# Patient Record
Sex: MALE | Race: BLACK OR AFRICAN AMERICAN
[De-identification: names, ages, dates, MRNs, and addresses within clinical notes are randomized per-mention and may not be internally consistent; named-entity substitution may affect disease eponyms.]

---

## 2017-01-21 ENCOUNTER — HOSPITAL ENCOUNTER (EMERGENCY)
Dept: HOSPITAL 62 - ER | Age: 67
Discharge: HOME | End: 2017-01-21
Payer: MEDICARE

## 2017-01-21 VITALS — DIASTOLIC BLOOD PRESSURE: 88 MMHG | SYSTOLIC BLOOD PRESSURE: 142 MMHG

## 2017-01-21 DIAGNOSIS — I50.9: ICD-10-CM

## 2017-01-21 DIAGNOSIS — R06.02: ICD-10-CM

## 2017-01-21 DIAGNOSIS — I25.2: ICD-10-CM

## 2017-01-21 DIAGNOSIS — E11.9: ICD-10-CM

## 2017-01-21 DIAGNOSIS — K21.9: ICD-10-CM

## 2017-01-21 DIAGNOSIS — E78.00: ICD-10-CM

## 2017-01-21 DIAGNOSIS — J44.9: ICD-10-CM

## 2017-01-21 DIAGNOSIS — Z87.442: ICD-10-CM

## 2017-01-21 DIAGNOSIS — R07.9: Primary | ICD-10-CM

## 2017-01-21 DIAGNOSIS — R42: ICD-10-CM

## 2017-01-21 DIAGNOSIS — I10: ICD-10-CM

## 2017-01-21 DIAGNOSIS — Z90.49: ICD-10-CM

## 2017-01-21 LAB
ALBUMIN SERPL-MCNC: 4.2 G/DL (ref 3.5–5)
ALP SERPL-CCNC: 76 U/L (ref 38–126)
ALT SERPL-CCNC: 40 U/L (ref 21–72)
ANION GAP SERPL CALC-SCNC: 12 MMOL/L (ref 5–19)
AST SERPL-CCNC: 38 U/L (ref 17–59)
BASOPHILS # BLD AUTO: 0 10^3/UL (ref 0–0.2)
BASOPHILS NFR BLD AUTO: 0.4 % (ref 0–2)
BILIRUB DIRECT SERPL-MCNC: 0 MG/DL (ref 0–0.3)
BILIRUB SERPL-MCNC: 0.4 MG/DL (ref 0.2–1.3)
BUN SERPL-MCNC: 21 MG/DL (ref 7–20)
CALCIUM: 9.4 MG/DL (ref 8.4–10.2)
CHLORIDE SERPL-SCNC: 105 MMOL/L (ref 98–107)
CK MB SERPL-MCNC: 1.48 NG/ML (ref ?–4.55)
CK SERPL-CCNC: 151 U/L (ref 55–170)
CO2 SERPL-SCNC: 28 MMOL/L (ref 22–30)
CREAT SERPL-MCNC: 1.81 MG/DL (ref 0.52–1.25)
EOSINOPHIL # BLD AUTO: 0.2 10^3/UL (ref 0–0.6)
EOSINOPHIL NFR BLD AUTO: 4.2 % (ref 0–6)
ERYTHROCYTE [DISTWIDTH] IN BLOOD BY AUTOMATED COUNT: 14.5 % (ref 11.5–14)
GLUCOSE SERPL-MCNC: 125 MG/DL (ref 75–110)
HCT VFR BLD CALC: 45.4 % (ref 37.9–51)
HGB BLD-MCNC: 14.3 G/DL (ref 13.5–17)
HGB HCT DIFFERENCE: -2.5
LYMPHOCYTES # BLD AUTO: 1.6 10^3/UL (ref 0.5–4.7)
LYMPHOCYTES NFR BLD AUTO: 31.6 % (ref 13–45)
MCH RBC QN AUTO: 30.6 PG (ref 27–33.4)
MCHC RBC AUTO-ENTMCNC: 31.6 G/DL (ref 32–36)
MCV RBC AUTO: 97 FL (ref 80–97)
MONOCYTES # BLD AUTO: 0.3 10^3/UL (ref 0.1–1.4)
MONOCYTES NFR BLD AUTO: 5.3 % (ref 3–13)
NEUTROPHILS # BLD AUTO: 3 10^3/UL (ref 1.7–8.2)
NEUTS SEG NFR BLD AUTO: 58.5 % (ref 42–78)
POTASSIUM SERPL-SCNC: 4.4 MMOL/L (ref 3.6–5)
PROT SERPL-MCNC: 6.9 G/DL (ref 6.3–8.2)
RBC # BLD AUTO: 4.67 10^6/UL (ref 4.35–5.55)
SODIUM SERPL-SCNC: 145.2 MMOL/L (ref 137–145)
TROPONIN I SERPL-MCNC: < 0.012 NG/ML
WBC # BLD AUTO: 5.1 10^3/UL (ref 4–10.5)

## 2017-01-21 PROCEDURE — 93010 ELECTROCARDIOGRAM REPORT: CPT

## 2017-01-21 PROCEDURE — 80053 COMPREHEN METABOLIC PANEL: CPT

## 2017-01-21 PROCEDURE — 82553 CREATINE MB FRACTION: CPT

## 2017-01-21 PROCEDURE — 85025 COMPLETE CBC W/AUTO DIFF WBC: CPT

## 2017-01-21 PROCEDURE — 84484 ASSAY OF TROPONIN QUANT: CPT

## 2017-01-21 PROCEDURE — 82550 ASSAY OF CK (CPK): CPT

## 2017-01-21 PROCEDURE — 83880 ASSAY OF NATRIURETIC PEPTIDE: CPT

## 2017-01-21 PROCEDURE — 99285 EMERGENCY DEPT VISIT HI MDM: CPT

## 2017-01-21 PROCEDURE — 36415 COLL VENOUS BLD VENIPUNCTURE: CPT

## 2017-01-21 PROCEDURE — 93005 ELECTROCARDIOGRAM TRACING: CPT

## 2017-01-21 PROCEDURE — 71010: CPT

## 2017-01-21 NOTE — ER DOCUMENT REPORT
ED General





- General


Chief Complaint: Chest Pain


Stated Complaint: DIFFICULTY BREATHING


Time seen by provider: 14:15


Mode of Arrival: Wheelchair


Information source: Patient, [facility] Records


Notes: 


This 66-year-old male patient comes over from complaining of onset 10 AM this 

morning while just sitting around getting ready to eat when he had chest 

pressure, shortness of breath, and felt dizzy.  He states he checked his blood 

pressure and it was 100 systolic.  At this time while I speak with him, his 

pulse is 78, his room air pulse ox is 95%, his blood pressure is 142/85.  He is 

not tachypneic.





He does have a history congestive heart failure with an EF of 20%, prior MI, 

diabetes, hypertension, chronic renal insufficiency and COPD.  This is 

complicated by depression and morbid obesity.


TRAVEL OUTSIDE OF THE [place] IN LAST 30 DAYS: No





- Related Data


Allergies/Adverse Reactions: 


 





No Known Allergies Allergy (Verified 01/21/17 13:53)


 











Past Medical History





- General


Information source: Patient, [facility] Records





- Social History


Smoking Status: Never Smoker


Cigarette use (# per day): No


Chew tobacco use (# tins/day): No


Smoking Education Provided: No


Frequency of alcohol use: None


Drug Abuse: None


Occupation: retired


Lives with: Alone


Family History: Reviewed & Not Pertinent


Patient has suicidal ideation: No


Patient has homicidal ideation: No





- Past Medical History


Cardiac Medical History: Reports: Hx Congestive Heart Failure, Hx Heart Attack, 

Hx Hypercholesterolemia, Hx Hypertension


Pulmonary Medical History: Reports: Hx COPD


EENT Medical History: Reports: None


Neurological Medical History: Reports: None


Endocrine Medical History: Reports: Hx Diabetes Mellitus Type 2


Renal/ Medical History: Reports: Hx Kidney Stones, Hx Renal Insufficiency


GI Medical History: Reports: Hx Gastroesophageal Reflux Disease


Musculoskeltal Medical History: Reports None


Skin Medical History: Reports None


Psychiatric Medical History: Reports: Hx Depression


Past Surgical History: Reports: Hx Abdominal Surgery - Nissen fundoplication, 

Hx Cholecystectomy, Hx Tonsillectomy





- Immunizations


Hx Diphtheria, Pertussis, Tetanus Vaccination: Yes


Hx Pneumococcal Vaccination: 01/01/13





Review of Systems





- Review of Systems


Constitutional: No symptoms reported


EENT: No symptoms reported


Cardiovascular: See HPI


Respiratory: See HPI


Gastrointestinal: No symptoms reported


Genitourinary: No symptoms reported


Musculoskeletal: No symptoms reported


Skin: No symptoms reported


Hematologic/Lymphatic: No symptoms reported


Neurological/Psychological: No symptoms reported





Physical Exam





- Vital signs


Vitals: 


 











Temp Pulse Resp BP Pulse Ox


 


 98.1 F   82   16   142/85 H  94 


 


 01/21/17 13:43  01/21/17 13:43  01/21/17 13:43  01/21/17 13:43  01/21/17 13:43











Interpretation: Normal





- General


General appearance: Appears well, Alert


In distress: None





- HEENT


Head: Normocephalic, Atraumatic


Eyes: Normal


Pupils: PERRL


Pharynx: Normal


Neck: Normal





- Respiratory


Respiratory status: No respiratory distress


Breath sounds: Normal





- Cardiovascular


Rhythm: Regular


Heart sounds: Normal auscultation


Murmur: No





- Abdominal


Inspection: Morbidly Obese


Bowel sounds: Normal


Tenderness: Nontender





- Back


Back: Normal





- Extremities


General upper extremity: Normal inspection


General lower extremity: Normal inspection.  No: Edema





- Neurological


Neuro grossly intact: Yes





- Psychological


Associated symptoms: Normal affect, Normal mood





- Skin


Skin Temperature: Warm


Skin Moisture: Dry


Skin Color: Normal





Course





- Re-evaluation


Re-evalutation: 


01/21/17 16:06


The patient continues to feel well and is in no distress.  His pulse ox remains 

in the 95-96% range on room air and he is not tachypneic.  His pulse remains in 

the mid 70s.  His blood pressure running 145/84.


His BNP is only 98, his chest x-ray shows no heart failure or infiltrates.  His 

EKG shows no acute changes.  His troponin is undetectable.  His chemistries and 

renal function are at his baseline, his CBC does not show suggestion of 

infection or anemia.





I spoke with the patient at this point have no explanation for the symptoms and 

blood pressure findings he reported at home.  He is cautioned to just take it 

easy and see how things go.  He is to return immediately if his symptoms return.











- Vital Signs


Vital signs: 


 











Temp Pulse Resp BP Pulse Ox


 


 98.1 F   82   22 H  136/79 H  94 


 


 01/21/17 13:43  01/21/17 13:43  01/21/17 15:31  01/21/17 15:31  01/21/17 15:31














- Laboratory


Result Diagrams: 


 01/21/17 14:16





 01/21/17 14:16


Laboratory results interpreted by me: 


 











  01/21/17 01/21/17





  14:16 14:16


 


MCHC  31.6 L 


 


RDW  14.5 H 


 


Sodium   145.2 H


 


BUN   21 H


 


Creatinine   1.81 H


 


Est GFR ( Amer)   45 L


 


Est GFR (Non-Af Amer)   38 L


 


Glucose   125 H














- Diagnostic Test


Radiology reviewed: Image reviewed, Reports reviewed - CXR--NAD





- EKG Interpretation by Me


EKG shows normal: Sinus rhythm, Axis, Intervals, ST-T Waves.  abnormal: QRS 

Complexes - Old inferior wall MI


Rate: Normal - 82


Rhythm: PVC's


Axis/QRS: IVCD


Heart block present: 1st Degree


When compared to previous EKG there are: No significant change





Discharge





- Discharge


Clinical Impression: 


 Chest tightness or pressure, Shortness of breath, Dizziness





Condition: Stable


Disposition: HOME, SELF-CARE


Additional Instructions: 


Your vital signs, chest x-ray, EKG, and a blood work did not show any 

suggestion of infection, heart attack, or heart failure.


There is no explanation found for the symptoms she experienced earlier this 

morning.


You should continue to rest over the next few days.  Continue her regular 

medications.


Follow-up with your doctor this week for recheck.





RETURN TO THE EMERGENCY ROOM IF ANY NEW OR WORSENING SYMPTOMS.








Referrals: 


NIKI GRANT MD [Primary Care Provider] - Follow up as needed

## 2017-01-21 NOTE — ER DOCUMENT REPORT
ED Medical Screen (RME)





- General


Stated Complaint: DIFFICULTY BREATHING


Time seen by provider: 13:52


Mode of Arrival: Wheelchair


Information source: Patient


Notes: 


66-year-old male with a history of MI, hypertension, diabetes is complaining of 

dizziness, low blood pressure that he took at home, and chest pressure that 

started at 10 AM this morning.


TRAVEL OUTSIDE OF THE [place] IN LAST 30 DAYS: No





- Related Data


Allergies/Adverse Reactions: 


 





No Known Allergies Allergy (Verified 01/21/17 13:53)


 











Past Medical History





- Past Medical History


Cardiac Medical History: Reports: Hx Congestive Heart Failure, Hx Heart Attack, 

Hx Hypercholesterolemia, Hx Hypertension


Pulmonary Medical History: Reports: Hx COPD


Endocrine Medical History: Reports: Hx Diabetes Mellitus Type 2


Renal/ Medical History: Reports: Hx Kidney Stones, Hx Renal Insufficiency


Psychiatric Medical History: Reports: Hx Depression


Past Surgical History: Reports: Hx Cholecystectomy, Hx Tonsillectomy





- Immunizations


Hx Diphtheria, Pertussis, Tetanus Vaccination: Yes





Physical Exam





- Vital signs


Vitals: 


 











Temp Pulse Resp BP Pulse Ox


 


 98.1 F   82   16   142/85 H  94 


 


 01/21/17 13:43  01/21/17 13:43  01/21/17 13:43  01/21/17 13:43  01/21/17 13:43














Course





- Vital Signs


Vital signs: 


 











Temp Pulse Resp BP Pulse Ox


 


 98.1 F   82   16   142/85 H  94 


 


 01/21/17 13:43  01/21/17 13:43  01/21/17 13:43  01/21/17 13:43  01/21/17 13:43

## 2017-01-21 NOTE — EKG REPORT
SEVERITY:- ABNORMAL ECG -

SINUS RHYTHM

MULTIPLE VENTRICULAR PREMATURE COMPLEXES

FIRST DEGREE AV BLOCK

NONSPECIFIC IVCD WITH LAD

PROBABLE INFERIOR INFARCT, OLD

CONSIDER ANTEROSEPTAL INFARCT

:

Confirmed by: Melquiades Baird MD 21-Jan-2017 16:37:41

## 2017-03-07 ENCOUNTER — HOSPITAL ENCOUNTER (EMERGENCY)
Dept: HOSPITAL 62 - ER | Age: 67
Discharge: HOME | End: 2017-03-07
Payer: MEDICARE

## 2017-03-07 VITALS — SYSTOLIC BLOOD PRESSURE: 141 MMHG | DIASTOLIC BLOOD PRESSURE: 89 MMHG

## 2017-03-07 DIAGNOSIS — R60.0: ICD-10-CM

## 2017-03-07 DIAGNOSIS — J44.9: Primary | ICD-10-CM

## 2017-03-07 DIAGNOSIS — Z87.891: ICD-10-CM

## 2017-03-07 DIAGNOSIS — R55: ICD-10-CM

## 2017-03-07 DIAGNOSIS — I25.2: ICD-10-CM

## 2017-03-07 DIAGNOSIS — I10: ICD-10-CM

## 2017-03-07 DIAGNOSIS — R53.1: ICD-10-CM

## 2017-03-07 DIAGNOSIS — E11.9: ICD-10-CM

## 2017-03-07 DIAGNOSIS — I49.3: ICD-10-CM

## 2017-03-07 DIAGNOSIS — I44.0: ICD-10-CM

## 2017-03-07 DIAGNOSIS — R06.02: ICD-10-CM

## 2017-03-07 LAB
ALBUMIN SERPL-MCNC: 4.1 G/DL (ref 3.5–5)
ALP SERPL-CCNC: 74 U/L (ref 38–126)
ALT SERPL-CCNC: 36 U/L (ref 21–72)
ANION GAP SERPL CALC-SCNC: 14 MMOL/L (ref 5–19)
AST SERPL-CCNC: 37 U/L (ref 17–59)
BASOPHILS # BLD AUTO: 0 10^3/UL (ref 0–0.2)
BASOPHILS NFR BLD AUTO: 0.1 % (ref 0–2)
BILIRUB DIRECT SERPL-MCNC: 0 MG/DL (ref 0–0.3)
BILIRUB SERPL-MCNC: 0.3 MG/DL (ref 0.2–1.3)
BUN SERPL-MCNC: 20 MG/DL (ref 7–20)
CALCIUM: 9.5 MG/DL (ref 8.4–10.2)
CHLORIDE SERPL-SCNC: 99 MMOL/L (ref 98–107)
CK MB SERPL-MCNC: 1.71 NG/ML (ref ?–4.55)
CK SERPL-CCNC: 168 U/L (ref 55–170)
CO2 SERPL-SCNC: 29 MMOL/L (ref 22–30)
CREAT SERPL-MCNC: 1.95 MG/DL (ref 0.52–1.25)
EOSINOPHIL # BLD AUTO: 0.1 10^3/UL (ref 0–0.6)
EOSINOPHIL NFR BLD AUTO: 2.1 % (ref 0–6)
ERYTHROCYTE [DISTWIDTH] IN BLOOD BY AUTOMATED COUNT: 14.3 % (ref 11.5–14)
GLUCOSE SERPL-MCNC: 104 MG/DL (ref 75–110)
HCT VFR BLD CALC: 44.4 % (ref 37.9–51)
HGB BLD-MCNC: 14.7 G/DL (ref 13.5–17)
HGB HCT DIFFERENCE: -0.3
LYMPHOCYTES # BLD AUTO: 1.1 10^3/UL (ref 0.5–4.7)
LYMPHOCYTES NFR BLD AUTO: 15.6 % (ref 13–45)
MCH RBC QN AUTO: 31.8 PG (ref 27–33.4)
MCHC RBC AUTO-ENTMCNC: 33.2 G/DL (ref 32–36)
MCV RBC AUTO: 96 FL (ref 80–97)
MONOCYTES # BLD AUTO: 0.4 10^3/UL (ref 0.1–1.4)
MONOCYTES NFR BLD AUTO: 6.1 % (ref 3–13)
NEUTROPHILS # BLD AUTO: 5.3 10^3/UL (ref 1.7–8.2)
NEUTS SEG NFR BLD AUTO: 76.1 % (ref 42–78)
POTASSIUM SERPL-SCNC: 4.3 MMOL/L (ref 3.6–5)
PROT SERPL-MCNC: 7.1 G/DL (ref 6.3–8.2)
RBC # BLD AUTO: 4.64 10^6/UL (ref 4.35–5.55)
SODIUM SERPL-SCNC: 142 MMOL/L (ref 137–145)
TROPONIN I SERPL-MCNC: < 0.012 NG/ML
WBC # BLD AUTO: 6.9 10^3/UL (ref 4–10.5)

## 2017-03-07 PROCEDURE — 93010 ELECTROCARDIOGRAM REPORT: CPT

## 2017-03-07 PROCEDURE — 93005 ELECTROCARDIOGRAM TRACING: CPT

## 2017-03-07 PROCEDURE — 80053 COMPREHEN METABOLIC PANEL: CPT

## 2017-03-07 PROCEDURE — 71010: CPT

## 2017-03-07 PROCEDURE — 85025 COMPLETE CBC W/AUTO DIFF WBC: CPT

## 2017-03-07 PROCEDURE — 36415 COLL VENOUS BLD VENIPUNCTURE: CPT

## 2017-03-07 PROCEDURE — 99285 EMERGENCY DEPT VISIT HI MDM: CPT

## 2017-03-07 PROCEDURE — 82962 GLUCOSE BLOOD TEST: CPT

## 2017-03-07 PROCEDURE — 82553 CREATINE MB FRACTION: CPT

## 2017-03-07 PROCEDURE — 82550 ASSAY OF CK (CPK): CPT

## 2017-03-07 PROCEDURE — 84484 ASSAY OF TROPONIN QUANT: CPT

## 2017-03-07 NOTE — ER DOCUMENT REPORT
ED General





- General


Mode of Arrival: Medic


Information source: Patient


TRAVEL OUTSIDE OF THE [place] IN LAST 30 DAYS: No





- HPI


Onset: This morning


Onset/Duration: Sudden


Quality of pain: No pain


Associated symptoms: Other - see narrative





<WILIAN ESCALANTE - Last Filed: 03/07/17 07:11>





<SILVIO CHAVIRA - Last Filed: 03/07/17 11:34>





- General


Stated Complaint: DIFFICULTY BREATHING


Notes: 


Patient is a 66 year old male that presents to the emergency department today 

with complaints of generalized weakness, near-syncope, dizziness, and shortness 

of breath which began this morning upon wakening. Patient states when he went 

to bed last night he felt normal and his symptoms began this morning. Patient 

denies any chest pain. (WILIAN ESCALANTE)





- Related Data


Allergies/Adverse Reactions: 


 





No Known Allergies Allergy (Verified 01/21/17 13:53)


 











Past Medical History





- General


Information source: Patient, [facility] Records





- Social History


Smoking Status: Former Smoker


Cigarette use (# per day): No


Frequency of alcohol use: None


Drug Abuse: None


Lives with: Family


Family History: Reviewed & Not Pertinent





- Past Medical History


Cardiac Medical History: Reports: Hx Congestive Heart Failure, Hx Heart Attack, 

Hx Hypercholesterolemia, Hx Hypertension


Pulmonary Medical History: Reports: Hx COPD


Endocrine Medical History: Reports: Hx Diabetes Mellitus Type 2 - IDDM


Renal/ Medical History: Reports: Hx Kidney Stones, Hx Renal Insufficiency


GI Medical History: Reports: Hx Gastroesophageal Reflux Disease


Psychiatric Medical History: Reports: Hx Depression


Past Surgical History: Reports: Hx Abdominal Surgery - Nissen fundoplication, 

Hx Cholecystectomy, Hx Tonsillectomy





- Immunizations


Hx Diphtheria, Pertussis, Tetanus Vaccination: Yes


Hx Pneumococcal Vaccination: 01/01/13





<WILIAN ESCALANTE - Last Filed: 03/07/17 07:11>





Review of Systems





- Review of Systems


Constitutional: See HPI, Weakness


EENT: No symptoms reported


Cardiovascular: See HPI, Syncope - near syncope, Dizziness


Respiratory: See HPI, Short of breath


Gastrointestinal: No symptoms reported


Genitourinary: No symptoms reported


Male Genitourinary: No symptoms reported


Musculoskeletal: No symptoms reported


Skin: No symptoms reported


Hematologic/Lymphatic: No symptoms reported


Neurological/Psychological: No symptoms reported


-: Yes All other systems reviewed and negative





<WILIAN ESCALANTE - Last Filed: 03/07/17 07:11>





Physical Exam





- Vital signs


Interpretation: Normal, Other - 98% on room air





- General


General appearance: Appears well, Alert


In distress: None





- HEENT


Head: Normocephalic, Atraumatic


Eyes: Normal


Conjunctiva: Normal


Extraocular movements intact: Yes





- Respiratory


Respiratory status: No respiratory distress


Chest status: Nontender


Breath sounds: Normal





- Cardiovascular


Rhythm: Regular


Heart sounds: Normal auscultation


Murmur: No





- Abdominal


Inspection: Normal


Distension: No distension


Bowel sounds: Normal





- Extremities


General upper extremity: Normal inspection, Normal ROM.  No: Edema


General lower extremity: Edema - trace edema bilaterally





- Neurological


Neuro grossly intact: Yes


Cognition: Normal


Speech: Normal





- Psychological


Associated symptoms: Normal affect, Normal mood





- Skin


Skin Temperature: Warm


Skin Moisture: Dry


Skin Color: Normal





<WILIAN ESCALANTE - Last Filed: 03/07/17 07:11>





Course





<WILIAN ESCALANTE - Last Filed: 03/07/17 07:11>





- Laboratory


Result Diagrams: 


 03/07/17 07:25





 03/07/17 07:25





- EKG Interpretation by Me


EKG shows normal: Sinus rhythm, Axis, Intervals, QRS Complexes, ST-T Waves


Rate: Normal - 65


Rhythm: NSR, PVC's


Axis/QRS: IVCD


Heart block present: 1st Degree





<SILVIO CHAVIRA - Last Filed: 03/07/17 11:34>





- Re-evaluation


Re-evalutation: 


03/07/17 11:27


The patient was seen here on 01/21/2017 complaining of shortness of breath, 

chest tightness or pressure, and dizziness.  Workup at that time was 

essentially negative and he was discharged home to follow-up with his primary 

care provider.  He states that no significant event occurred in the interim 

between that visit and today.


Today he complains of weakness, dizziness and shortness of breath and again has 

a negative workup.








 (SILVIO CHAVIRA)





- Vital Signs


Vital signs: 


 











Temp Pulse Resp BP Pulse Ox


 


       14   125/87 H  97 


 


       03/07/17 08:01  03/07/17 08:01  03/07/17 08:01














- Laboratory


Laboratory results interpreted by me: 


 











  03/07/17 03/07/17





  07:25 07:25


 


RDW  14.3 H 


 


Creatinine   1.95 H


 


Est GFR ( Amer)   42 L


 


Est GFR (Non-Af Amer)   34 L














Discharge





<WILIAN ESCALANTE - Last Filed: 03/07/17 07:11>





<SILVIO CHAVIRA - Last Filed: 03/07/17 11:34>





- Discharge


Clinical Impression: 


 Weakness, Dizziness





Dyspnea


Qualifiers:


 Dyspnea type: unspecified Qualified Code(s): R06.00 - Dyspnea, unspecified





Condition: Stable


Disposition: HOME, SELF-CARE


Additional Instructions: 


There were no new or worrisome findings discovered on your lab work, physical 

examination, chest x-ray, and EKG today.


There is no clear explanation for your symptoms.


You should continue your regular medications.


Follow-up with Dr. Arevalo.





RETURN TO THE EMERGENCY ROOM IF ANY NEW OR WORSENING SYMPTOMS.








Referrals: 


NIKI AREVALO MD [Primary Care Provider] - Follow up in 3-5 days


Scribe Attestation: 





03/07/17 11:33


I personally performed the services described in the documentation, reviewed 

and edited the documentation which was dictated to the scribe in my presence, 

and it accurately records my words and actions. (SILVIO CHAVIRA)





Scribe Documentation





- Scribe


Written by Jerry:: Jerry Suh, 3/7/2017 0711


acting as scribe for :: No





<WILIAN ESCALANTE - Last Filed: 03/07/17 07:11>

## 2017-03-08 NOTE — EKG REPORT
SEVERITY:- ABNORMAL ECG -

SINUS RHYTHM

VENTRICULAR TRIGEMINY

FIRST DEGREE AV BLOCK

NONSPECIFIC INTRAVENTRICULAR CONDUCTION DELAY

:

Confirmed by: Jerardo Ulloa 08-Mar-2017 09:34:06

## 2017-06-09 ENCOUNTER — HOSPITAL ENCOUNTER (OUTPATIENT)
Dept: HOSPITAL 62 - OD | Age: 67
End: 2017-06-09
Attending: INTERNAL MEDICINE
Payer: MEDICARE

## 2017-06-09 DIAGNOSIS — E11.22: Primary | ICD-10-CM

## 2017-06-09 DIAGNOSIS — I12.9: ICD-10-CM

## 2017-06-09 DIAGNOSIS — N18.3: ICD-10-CM

## 2017-06-09 DIAGNOSIS — E87.5: ICD-10-CM

## 2017-06-09 LAB
ANION GAP SERPL CALC-SCNC: 13 MMOL/L (ref 5–19)
BUN SERPL-MCNC: 20 MG/DL (ref 7–20)
CALCIUM: 9.1 MG/DL (ref 8.4–10.2)
CHLORIDE SERPL-SCNC: 104 MMOL/L (ref 98–107)
CO2 SERPL-SCNC: 27 MMOL/L (ref 22–30)
CREAT SERPL-MCNC: 1.82 MG/DL (ref 0.52–1.25)
ERYTHROCYTE [DISTWIDTH] IN BLOOD BY AUTOMATED COUNT: 14.8 % (ref 11.5–14)
GLUCOSE SERPL-MCNC: 97 MG/DL (ref 75–110)
HCT VFR BLD CALC: 44.5 % (ref 37.9–51)
HGB BLD-MCNC: 14.5 G/DL (ref 13.5–17)
HGB HCT DIFFERENCE: -1
MCH RBC QN AUTO: 31.7 PG (ref 27–33.4)
MCHC RBC AUTO-ENTMCNC: 32.6 G/DL (ref 32–36)
MCV RBC AUTO: 97 FL (ref 80–97)
POTASSIUM SERPL-SCNC: 4.3 MMOL/L (ref 3.6–5)
RBC # BLD AUTO: 4.59 10^6/UL (ref 4.35–5.55)
SODIUM SERPL-SCNC: 144.2 MMOL/L (ref 137–145)
WBC # BLD AUTO: 6 10^3/UL (ref 4–10.5)

## 2017-06-09 PROCEDURE — 85027 COMPLETE CBC AUTOMATED: CPT

## 2017-06-09 PROCEDURE — 36415 COLL VENOUS BLD VENIPUNCTURE: CPT

## 2017-06-09 PROCEDURE — 80048 BASIC METABOLIC PNL TOTAL CA: CPT

## 2017-07-06 ENCOUNTER — HOSPITAL ENCOUNTER (EMERGENCY)
Dept: HOSPITAL 62 - ER | Age: 67
LOS: 1 days | Discharge: HOME | End: 2017-07-07
Payer: MEDICARE

## 2017-07-06 DIAGNOSIS — R07.9: Primary | ICD-10-CM

## 2017-07-06 DIAGNOSIS — R05: ICD-10-CM

## 2017-07-06 LAB
ALBUMIN SERPL-MCNC: 3.9 G/DL (ref 3.5–5)
ALP SERPL-CCNC: 81 U/L (ref 38–126)
ALT SERPL-CCNC: 41 U/L (ref 21–72)
ANION GAP SERPL CALC-SCNC: 11 MMOL/L (ref 5–19)
AST SERPL-CCNC: 34 U/L (ref 17–59)
BASOPHILS # BLD AUTO: 0.1 10^3/UL (ref 0–0.2)
BASOPHILS NFR BLD AUTO: 0.9 % (ref 0–2)
BILIRUB DIRECT SERPL-MCNC: 0.3 MG/DL (ref 0–0.4)
BILIRUB SERPL-MCNC: 0.4 MG/DL (ref 0.2–1.3)
BUN SERPL-MCNC: 21 MG/DL (ref 7–20)
CALCIUM: 9 MG/DL (ref 8.4–10.2)
CHLORIDE SERPL-SCNC: 103 MMOL/L (ref 98–107)
CK MB SERPL-MCNC: 1.69 NG/ML (ref ?–4.55)
CK SERPL-CCNC: 142 U/L (ref 55–170)
CO2 SERPL-SCNC: 29 MMOL/L (ref 22–30)
CREAT SERPL-MCNC: 1.94 MG/DL (ref 0.52–1.25)
EOSINOPHIL # BLD AUTO: 0.4 10^3/UL (ref 0–0.6)
EOSINOPHIL NFR BLD AUTO: 6 % (ref 0–6)
ERYTHROCYTE [DISTWIDTH] IN BLOOD BY AUTOMATED COUNT: 14.5 % (ref 11.5–14)
GLUCOSE SERPL-MCNC: 93 MG/DL (ref 75–110)
HCT VFR BLD CALC: 44.9 % (ref 37.9–51)
HGB BLD-MCNC: 14.3 G/DL (ref 13.5–17)
HGB HCT DIFFERENCE: -2
LYMPHOCYTES # BLD AUTO: 2.1 10^3/UL (ref 0.5–4.7)
LYMPHOCYTES NFR BLD AUTO: 33.4 % (ref 13–45)
MCH RBC QN AUTO: 31.3 PG (ref 27–33.4)
MCHC RBC AUTO-ENTMCNC: 31.9 G/DL (ref 32–36)
MCV RBC AUTO: 98 FL (ref 80–97)
MONOCYTES # BLD AUTO: 0.5 10^3/UL (ref 0.1–1.4)
MONOCYTES NFR BLD AUTO: 7.3 % (ref 3–13)
NEUTROPHILS # BLD AUTO: 3.4 10^3/UL (ref 1.7–8.2)
NEUTS SEG NFR BLD AUTO: 52.4 % (ref 42–78)
POTASSIUM SERPL-SCNC: 4.4 MMOL/L (ref 3.6–5)
PROT SERPL-MCNC: 7.2 G/DL (ref 6.3–8.2)
RBC # BLD AUTO: 4.57 10^6/UL (ref 4.35–5.55)
SODIUM SERPL-SCNC: 142.7 MMOL/L (ref 137–145)
TROPONIN I SERPL-MCNC: 0.01 NG/ML
WBC # BLD AUTO: 6.4 10^3/UL (ref 4–10.5)

## 2017-07-06 PROCEDURE — 71010: CPT

## 2017-07-06 PROCEDURE — 80053 COMPREHEN METABOLIC PANEL: CPT

## 2017-07-06 PROCEDURE — 93005 ELECTROCARDIOGRAM TRACING: CPT

## 2017-07-06 PROCEDURE — 85025 COMPLETE CBC W/AUTO DIFF WBC: CPT

## 2017-07-06 PROCEDURE — 82553 CREATINE MB FRACTION: CPT

## 2017-07-06 PROCEDURE — 99285 EMERGENCY DEPT VISIT HI MDM: CPT

## 2017-07-06 PROCEDURE — 82550 ASSAY OF CK (CPK): CPT

## 2017-07-06 PROCEDURE — 36415 COLL VENOUS BLD VENIPUNCTURE: CPT

## 2017-07-06 PROCEDURE — 93010 ELECTROCARDIOGRAM REPORT: CPT

## 2017-07-06 PROCEDURE — 84484 ASSAY OF TROPONIN QUANT: CPT

## 2017-07-06 NOTE — RADIOLOGY REPORT (SQ)
EXAM DESCRIPTION:  CHEST SINGLE VIEW



COMPLETED DATE/TIME:  7/6/2017 11:09 pm



REASON FOR STUDY:  chest pain



COMPARISON:  1/2/2016



EXAM PARAMETERS:  NUMBER OF VIEWS: One view.

TECHNIQUE: Single frontal radiographic view of the chest acquired.

RADIATION DOSE: NA

LIMITATIONS: None.



FINDINGS:  LUNGS AND PLEURA: No opacities, masses or pneumothorax. No pleural effusion.

MEDIASTINUM AND HILAR STRUCTURES: No masses.  Contour normal.

HEART AND VASCULAR STRUCTURES: Heart normal in size.  Normal vasculature.

BONES: No acute findings.

HARDWARE: None in the chest.

OTHER: No other significant finding.



IMPRESSION:  NO ACUTE RADIOGRAPHIC FINDING IN THE CHEST.



TECHNICAL DOCUMENTATION:  JOB ID:  [id number]

## 2017-07-07 VITALS — SYSTOLIC BLOOD PRESSURE: 162 MMHG | DIASTOLIC BLOOD PRESSURE: 88 MMHG

## 2017-07-07 NOTE — ER DOCUMENT REPORT
ED General





- General


Chief Complaint: Chest Pain


Stated Complaint: CHEST PAIN


Time Seen by Provider: 07/07/17 00:25


Notes: 


Patient is a 66-year-old male who presents with complaint of episode of chest 

pain occurred while he was coughing.  Says a pressure-like sensation that 

started in his chest while he was coughing.  He says that he sucked on a piece 

of hard candy to help control the cough.  When she cough went away his chest 

pain improved.  He does not have a history of cardiac stents but says he did 

have a mild MI in the past.  He does have history of congestive heart failure 

and is followed by his cardiologist, Dr. Herman, in [place], [place].  He says he did have a normal cardiac cath back in December.  Last 

stress test was 2 years ago.  He has no other complaints at this time.  He is 

currently chest pain-free.


TRAVEL OUTSIDE OF THE [place] IN LAST 30 DAYS: No





- Related Data


Allergies/Adverse Reactions: 


 





No Known Allergies Allergy (Verified 01/21/17 13:53)


 











Past Medical History





- Social History


Smoking Status: Never Smoker


Frequency of alcohol use: None


Drug Abuse: None


Family History: Reviewed & Not Pertinent


Patient has suicidal ideation: No


Patient has homicidal ideation: No





- Past Medical History


Cardiac Medical History: Reports: Hx Congestive Heart Failure, Hx Heart Attack, 

Hx Hypercholesterolemia, Hx Hypertension


Pulmonary Medical History: Reports: Hx COPD


Endocrine Medical History: Reports: Hx Diabetes Mellitus Type 2 - IDDM


Renal/ Medical History: Reports: Hx Kidney Stones, Hx Renal Insufficiency.  

Denies: Hx Peritoneal Dialysis


GI Medical History: Reports: Hx Gastroesophageal Reflux Disease


Psychiatric Medical History: Reports: Hx Depression


Past Surgical History: Reports: Hx Abdominal Surgery - Nissen fundoplication, 

Hx Cholecystectomy, Hx Tonsillectomy





- Immunizations


Hx Diphtheria, Pertussis, Tetanus Vaccination: Yes


Hx Pneumococcal Vaccination: 01/01/13





Review of Systems





- Review of Systems


Notes: 


My Normal Review Basic





REVIEW OF SYSTEMS:


CONSTITUTIONAL :  Denies fever,  chills, or sweats.  Denies recent illness.


EENT:   Denies eye, ear, throat, or mouth pain or symptoms.  Denies nasal or 

sinus congestion.


CARDIOVASCULAR: Had chest pain, now resolved.


RESPIRATORY:  Denies cough, cold, or chest congestion.  Denies shortness of 

breath, difficulty breathing, or wheezing.


GASTROINTESTINAL:  Denies abdominal pain.  Denies nausea, vomiting, or diarrhea.


MUSCULOSKELETAL:  Denies neck or back pain or joint pain or swelling.


SKIN:   Denies rash or skin lesions.


NEUROLOGICAL:  Denies altered mental status or loss of consciousness.  Denies 

headache.  Denies weakness or paralysis or loss of use of either side.  Denies 

problems with gait or speech.  Denies sensory or motor loss.


ALL OTHER SYSTEMS REVIEWED AND NEGATIVE.








Physical Exam





- Vital signs


Vitals: 


 











Temp Pulse Resp BP Pulse Ox


 


 98.2 F   65   20   142/76 H  96 


 


 07/06/17 21:37  07/06/17 21:37  07/06/17 21:37  07/06/17 21:37  07/06/17 21:37














- Notes


Notes: 


General Appearance: Well nourished, alert, cooperative, no acute distress, no 

obvious discomfort.  Well appearing.


Vitals: reviewed, See vital signs table.


Head: no swelling or tenderness to the head


Eyes: PERRL, EOMI, Conjuctiva clear


Mouth: No decreasd moisture


Lungs: No wheezing, No rales, No rhonci, No accessory muscle use, good air 

exchange bilaterally.


Heart: Normal rate, Regular rythm, No murmur, no rub


Abdomen: Normal BS, soft, No rigidity, No abdominal tenderness, No guarding, no 

rebound, no abdominal masses, no organomegaly


Extremities: strength 5/5 in all extremities, good pulses in all extremities, 

no swelling or tenderness in the extremities, no edema.


Skin: warm, dry, appropriate color, no rash


Neuro: speech clear, oriented x 3, normal affect, responds appropriately to 

questions.








Course





- Re-evaluation


Re-evalutation: 





07/08/17 00:16


Patient remains chest pain-free.  His chest pain does not sound consistent with 

cardiac disease and that the chest pain seemed to be exacerbated by his 

coughing.  His EKG is normal.  His troponin and delta troponin are negative.  I 

did discuss the case with his cardiologist, Dr. Herman, has no further 

recommendations and says patient to follow-up with him in his office.  Patient 

is agreeable with plan.  I informed the patient must return to ER immediately 

if he has any recurrence of chest pain or feels unwell or has difficulty 

breathing.  Patient agrees with plan and will be discharged home.





Dictation of this chart was performed using voice recognition software; 

therefore, there may be some unintended grammatical errors.





- Vital Signs


Vital signs: 


 











Temp Pulse Resp BP Pulse Ox


 


 98.2 F   65   22 H  162/88 H  94 


 


 07/06/17 21:37  07/06/17 21:37  07/07/17 03:31  07/07/17 03:31  07/07/17 03:31














- Laboratory


Result Diagrams: 


 07/06/17 22:25





 07/06/17 22:25


Laboratory results interpreted by me: 


 











  07/06/17 07/06/17





  22:25 22:25


 


MCV  98 H 


 


MCHC  31.9 L 


 


RDW  14.5 H 


 


BUN   21 H


 


Creatinine   1.94 H


 


Est GFR ( Amer)   42 L


 


Est GFR (Non-Af Amer)   35 L














- EKG Interpretation by Me


Additional EKG results interpreted by me: 





07/07/17 00:27


EKG is reviewed and interpreted by me.  EKG shows normal sinus rhythm with rate 

of 69 bpm.  Patient has no ST segment elevation or depression.  No ischemic T-

wave inversions.  TX interval is prolonged.  QRS duration and QTc intervals are 

within normal range.  Old EKG for comparison is from March 7, 2017.





Discharge





- Discharge


Clinical Impression: 


Chest pain


Qualifiers:


 Chest pain type: unspecified Qualified Code(s): R07.9 - Chest pain, unspecified





Condition: Good


Disposition: HOME, SELF-CARE


Additional Instructions: 


Please return to the ER immediately if you have any recurrent chest pain or 

difficulty breathing. I did discuss your case tonight with Dr. Herman. 

Please call Dr. Herman's office for a follow up appointment. Please continue 

to take your medications as prescribed.


Referrals: 


JESSICA CHAVEZ MD [Primary Care Provider] - Follow up as needed

## 2017-07-07 NOTE — EKG REPORT
SEVERITY:- ABNORMAL ECG -

SINUS RHYTHM

FIRST DEGREE AV BLOCK

CONSIDER ANTEROSEPTAL INFARCT

BORDERLINE T WAVE ABNORMALITIES

:

Confirmed by: Melquiades Baird MD 07-Jul-2017 08:13:21

## 2017-09-01 ENCOUNTER — HOSPITAL ENCOUNTER (OUTPATIENT)
Dept: HOSPITAL 62 - OD | Age: 67
End: 2017-09-01
Attending: INTERNAL MEDICINE
Payer: MEDICARE

## 2017-09-01 DIAGNOSIS — I12.9: ICD-10-CM

## 2017-09-01 DIAGNOSIS — N18.3: ICD-10-CM

## 2017-09-01 DIAGNOSIS — E11.22: Primary | ICD-10-CM

## 2017-09-01 LAB
ANION GAP SERPL CALC-SCNC: 9 MMOL/L (ref 5–19)
APPEARANCE UR: CLEAR
BILIRUB UR QL STRIP: NEGATIVE
BUN SERPL-MCNC: 18 MG/DL (ref 7–20)
CALCIUM: 9.4 MG/DL (ref 8.4–10.2)
CHLORIDE SERPL-SCNC: 104 MMOL/L (ref 98–107)
CO2 SERPL-SCNC: 29 MMOL/L (ref 22–30)
CREAT SERPL-MCNC: 1.96 MG/DL (ref 0.52–1.25)
ERYTHROCYTE [DISTWIDTH] IN BLOOD BY AUTOMATED COUNT: 14.1 % (ref 11.5–14)
GLUCOSE SERPL-MCNC: 111 MG/DL (ref 75–110)
GLUCOSE UR STRIP-MCNC: NEGATIVE MG/DL
HCT VFR BLD CALC: 43.7 % (ref 37.9–51)
HGB BLD-MCNC: 14.7 G/DL (ref 13.5–17)
HGB HCT DIFFERENCE: 0.4
KETONES UR STRIP-MCNC: NEGATIVE MG/DL
MCH RBC QN AUTO: 32.6 PG (ref 27–33.4)
MCHC RBC AUTO-ENTMCNC: 33.7 G/DL (ref 32–36)
MCV RBC AUTO: 97 FL (ref 80–97)
NITRITE UR QL STRIP: NEGATIVE
PH UR STRIP: 6 [PH] (ref 5–9)
POTASSIUM SERPL-SCNC: 4.6 MMOL/L (ref 3.6–5)
PROT UR STRIP-MCNC: NEGATIVE MG/DL
RBC # BLD AUTO: 4.52 10^6/UL (ref 4.35–5.55)
SODIUM SERPL-SCNC: 142.3 MMOL/L (ref 137–145)
SP GR UR STRIP: 1.02
UROBILINOGEN UR-MCNC: NEGATIVE MG/DL (ref ?–2)
WBC # BLD AUTO: 5.8 10^3/UL (ref 4–10.5)

## 2017-09-01 PROCEDURE — 36415 COLL VENOUS BLD VENIPUNCTURE: CPT

## 2017-09-01 PROCEDURE — 85027 COMPLETE CBC AUTOMATED: CPT

## 2017-09-01 PROCEDURE — 80048 BASIC METABOLIC PNL TOTAL CA: CPT

## 2017-09-01 PROCEDURE — 81001 URINALYSIS AUTO W/SCOPE: CPT

## 2017-09-29 ENCOUNTER — HOSPITAL ENCOUNTER (EMERGENCY)
Dept: HOSPITAL 62 - ER | Age: 67
Discharge: HOME | End: 2017-09-29
Payer: OTHER GOVERNMENT

## 2017-09-29 VITALS — DIASTOLIC BLOOD PRESSURE: 92 MMHG | SYSTOLIC BLOOD PRESSURE: 134 MMHG

## 2017-09-29 DIAGNOSIS — I25.2: ICD-10-CM

## 2017-09-29 DIAGNOSIS — N18.9: ICD-10-CM

## 2017-09-29 DIAGNOSIS — E11.22: ICD-10-CM

## 2017-09-29 DIAGNOSIS — J44.9: ICD-10-CM

## 2017-09-29 DIAGNOSIS — R07.89: Primary | ICD-10-CM

## 2017-09-29 DIAGNOSIS — I12.9: ICD-10-CM

## 2017-09-29 LAB
ANION GAP SERPL CALC-SCNC: 9 MMOL/L (ref 5–19)
BASOPHILS # BLD AUTO: 0.1 10^3/UL (ref 0–0.2)
BASOPHILS NFR BLD AUTO: 0.9 % (ref 0–2)
BUN SERPL-MCNC: 20 MG/DL (ref 7–20)
CALCIUM: 9.4 MG/DL (ref 8.4–10.2)
CHLORIDE SERPL-SCNC: 106 MMOL/L (ref 98–107)
CO2 SERPL-SCNC: 28 MMOL/L (ref 22–30)
CREAT SERPL-MCNC: 1.91 MG/DL (ref 0.52–1.25)
EOSINOPHIL # BLD AUTO: 0.3 10^3/UL (ref 0–0.6)
EOSINOPHIL NFR BLD AUTO: 4.6 % (ref 0–6)
ERYTHROCYTE [DISTWIDTH] IN BLOOD BY AUTOMATED COUNT: 14.3 % (ref 11.5–14)
GLUCOSE SERPL-MCNC: 107 MG/DL (ref 75–110)
HCT VFR BLD CALC: 44.8 % (ref 37.9–51)
HGB BLD-MCNC: 15.1 G/DL (ref 13.5–17)
HGB HCT DIFFERENCE: 0.5
LYMPHOCYTES # BLD AUTO: 2 10^3/UL (ref 0.5–4.7)
LYMPHOCYTES NFR BLD AUTO: 33.5 % (ref 13–45)
MCH RBC QN AUTO: 32.5 PG (ref 27–33.4)
MCHC RBC AUTO-ENTMCNC: 33.8 G/DL (ref 32–36)
MCV RBC AUTO: 96 FL (ref 80–97)
MONOCYTES # BLD AUTO: 0.5 10^3/UL (ref 0.1–1.4)
MONOCYTES NFR BLD AUTO: 7.6 % (ref 3–13)
NEUTROPHILS # BLD AUTO: 3.2 10^3/UL (ref 1.7–8.2)
NEUTS SEG NFR BLD AUTO: 53.4 % (ref 42–78)
POTASSIUM SERPL-SCNC: 4.4 MMOL/L (ref 3.6–5)
RBC # BLD AUTO: 4.66 10^6/UL (ref 4.35–5.55)
SODIUM SERPL-SCNC: 143.3 MMOL/L (ref 137–145)
WBC # BLD AUTO: 6 10^3/UL (ref 4–10.5)

## 2017-09-29 PROCEDURE — 80048 BASIC METABOLIC PNL TOTAL CA: CPT

## 2017-09-29 PROCEDURE — 85025 COMPLETE CBC W/AUTO DIFF WBC: CPT

## 2017-09-29 PROCEDURE — 71010: CPT

## 2017-09-29 PROCEDURE — 84484 ASSAY OF TROPONIN QUANT: CPT

## 2017-09-29 PROCEDURE — 99285 EMERGENCY DEPT VISIT HI MDM: CPT

## 2017-09-29 PROCEDURE — 36415 COLL VENOUS BLD VENIPUNCTURE: CPT

## 2017-09-29 PROCEDURE — 93010 ELECTROCARDIOGRAM REPORT: CPT

## 2017-09-29 PROCEDURE — 93005 ELECTROCARDIOGRAM TRACING: CPT

## 2017-09-29 NOTE — ER DOCUMENT REPORT
ED General





- General


Chief Complaint: Chest Pain


Stated Complaint: CHEST TIGHTNESS


Time Seen by Provider: 09/29/17 19:04


Notes: 





Patient is a 66-year-old male with past medical history of chronic kidney 

disease, nonischemic cardia myopathy, who presents with chest pain.  He 

describes it as a chest pressure in the central chest without any radiation of 

the pain.  This pressure-like sensation has been constant since onset although 

he notes it is not present at time of presentation.  Nothing improves or 

worsens the pain.  He has had similar symptoms in the past but has no prior 

history of known coronary artery disease or MI.  He did have a cardiac 

catheterization in December 2016 which was normal without any evidence of 

active coronary artery disease.  He has not seen his primary care doctor 

regarding today's concerns.  He denies any associated shortness of breath, 

nausea, vomiting or diaphoresis.


TRAVEL OUTSIDE OF THE [place] IN LAST 30 DAYS: No





- Related Data


Allergies/Adverse Reactions: 


 





No Known Allergies Allergy (Verified 01/21/17 13:53)


 











Past Medical History





- General


Information source: Patient





- Social History


Smoking Status: Never Smoker


Frequency of alcohol use: None


Drug Abuse: None


Lives with: Family


Family History: Reviewed & Not Pertinent





- Past Medical History


Cardiac Medical History: Reports: Hx Congestive Heart Failure, Hx Heart Attack, 

Hx Hypercholesterolemia, Hx Hypertension


Pulmonary Medical History: Reports: Hx COPD


Endocrine Medical History: Reports: Hx Diabetes Mellitus Type 2 - IDDM


Renal/ Medical History: Reports: Hx Kidney Stones, Hx Renal Insufficiency.  

Denies: Hx Peritoneal Dialysis


GI Medical History: Reports: Hx Gastroesophageal Reflux Disease


Psychiatric Medical History: Reports: Hx Depression


Past Surgical History: Reports: Hx Abdominal Surgery - Nissen fundoplication, 

Hx Cardiac Surgery - cardioMEMS implanted, Hx Cholecystectomy, Hx Tonsillectomy





- Immunizations


Hx Diphtheria, Pertussis, Tetanus Vaccination: Yes


Hx Pneumococcal Vaccination: 01/01/13





Review of Systems





- Review of Systems


Notes: 





Constitutional: Negative for fever.


HENT: Negative for sore throat.


Eyes: Negative for visual changes.


Cardiovascular: Positive for chest pain.


Respiratory: Negative for shortness of breath.


Gastrointestinal: Negative for abdominal pain, vomiting or diarrhea.


Genitourinary: Negative for dysuria.


Musculoskeletal: Negative for back pain.


Skin: Negative for rash.


Neurological: Negative for headaches, weakness or numbness.





10 point ROS negative except as marked above and in HPI.





Physical Exam





- Vital signs


Vitals: 


 











Temp Pulse Resp BP Pulse Ox


 


 98.7 F   70   18   134/83 H  95 


 


 09/29/17 15:56  09/29/17 15:56  09/29/17 15:56  09/29/17 15:56  09/29/17 15:56











Interpretation: Normal


Notes: 





PHYSICAL EXAMINATION:





GENERAL: Well-appearing, well-nourished and in no acute distress.





HEAD: Atraumatic, normocephalic.





EYES: Pupils equal round and reactive to light, extraocular movements intact, 

sclera anicteric, conjunctiva are normal.





ENT: nares patent, oropharynx clear without exudates.  Moist mucous membranes.





NECK: Normal range of motion, supple without lymphadenopathy





LUNGS: Breath sounds clear to auscultation bilaterally and equal.  No wheezes 

rales or rhonchi.





HEART: Regular rate and rhythm without murmurs





ABDOMEN: Soft, nontender, normoactive bowel sounds.  No guarding, no rebound.  

No masses appreciated.





EXTREMITIES: Normal range of motion, no pitting or edema.  No cyanosis.





NEUROLOGICAL: No focal neurological deficits. Moves all extremities 

spontaneously and on command.





PSYCH: Normal mood, normal affect.





SKIN: Warm, Dry, normal turgor, no rashes or lesions noted.





Course





- Re-evaluation


Re-evalutation: 





09/29/17 20:33


Presentation of chest pain in an otherwise well appearing patient. Low clinical 

suspicion for ACS given clinical history, exam, EKG without ST elevations or 

depressions, and negative initial troponin.  PE also seems unlikely given 

clinical history, absence of tachycardia or dyspnea. Well's score is 0. CXR 

without evidence of pneumothorax or pneumonia. No widened mediastinum. Aortic 

dissection also seems unlikely given history, symmetric pulses, CXR, and 

vitals.  Patient's clinical history seems to be much more consistent with 

likely upper intestinal irritation as he knows that he has felt gaseous and was 

like he needs to have a bowel movement or passed flatus and when he does so the 

pain seems to go away.  He is pain-free at time of presentation.  Will repeat a 

second troponin 3 hours from the initial if this remains normal plan for 

discharge home.  Patient is agreeable to this


09/29/17 22:32


Second troponin remains normal.  Patient remains chest pain-free.  At this time 

will discharge with return precautions and follow-up recommendations.  Verbal 

discharge instructions given a the bedside and opportunity for questions given. 

Medication warnings reviewed. Patient is in agreement with this plan and has 

verbalized understanding of return precautions and the need for primary care 

follow-up in the next 24-72 hours.





- Vital Signs


Vital signs: 


 











Temp Pulse Resp BP Pulse Ox


 


 98.7 F   70   25 H  134/92 H  95 


 


 09/29/17 15:56  09/29/17 15:56  09/29/17 22:01  09/29/17 22:01  09/29/17 22:01














- Laboratory


Result Diagrams: 


 09/29/17 18:05





 09/29/17 18:05


Laboratory results interpreted by me: 


 











  09/29/17 09/29/17





  18:05 18:05


 


RDW  14.3 H 


 


Creatinine   1.91 H


 


Est GFR ( Amer)   43 L


 


Est GFR (Non-Af Amer)   35 L














- Diagnostic Test


Radiology reviewed: Image reviewed, Reports reviewed


Radiology results interpreted by me: 





09/29/17 20:34


Chest x-ray: No acute infiltrate or pneumothorax 





- EKG Interpretation by Me


Additional EKG results interpreted by me: 





09/29/17 20:37


Sinus rhythm.  Rate 75.  No ST elevations or depressions.  QTC is 465.





Discharge





- Discharge


Clinical Impression: 


Chest pain


Qualifiers:


 Chest pain type: unspecified Qualified Code(s): R07.9 - Chest pain, unspecified





Chronic kidney disease


Qualifiers:


 Chronic kidney disease stage: unspecified stage Qualified Code(s): N18.9 - 

Chronic kidney disease, unspecified





Condition: Stable


Disposition: HOME, SELF-CARE


Additional Instructions: 


You were seen today for chest pain.  The exact cause of your pain is unclear. 

However, based on your cardiac enzyme testing, chest x-ray, and EKG it does not 

appear that it is from an immediately life-threatening cause at this time.  

Although your testing here is normal is critical that you follow-up with your 

primary care physician for continued evaluation of this chest pain and possible 

stress testing.  I recommended you see your physician within the next 24-48 

hours to be evaluated for consideration of a stress test.  Please return to 

emergency department immediately if you have worsening of your chest pain, 

shortness of breath, vomiting, become unable to exert yourself due to pain or 

difficulty breathing, you pass out, or have any pain that radiates into your 

arms, jaw, or back. Please also return if you have any additional symptoms that 

are concerning to you.

## 2017-09-29 NOTE — RADIOLOGY REPORT (SQ)
EXAM DESCRIPTION:  CHEST SINGLE VIEW



COMPLETED DATE/TIME:  9/29/2017 7:14 pm



REASON FOR STUDY:  chest pain



COMPARISON:  7/6/2017 7/6/2017



EXAM PARAMETERS:  NUMBER OF VIEWS: One view.

TECHNIQUE: Single frontal radiographic view of the chest acquired.

RADIATION DOSE: NA

LIMITATIONS: None.



FINDINGS:  LUNGS AND PLEURA: No opacities, masses or pneumothorax. No pleural effusion.

MEDIASTINUM AND HILAR STRUCTURES: No masses.  Contour normal.

HEART AND VASCULAR STRUCTURES: Heart stable in size.  Normal vasculature.

BONES: No acute findings.

HARDWARE: Stable surgical device projects over the right hilum.

OTHER: No other significant finding.



IMPRESSION:  NO ACUTE RADIOGRAPHIC FINDING IN THE CHEST.  NO SIGNIFICANT CHANGE FROM PRIOR STUDY.



TECHNICAL DOCUMENTATION:  JOB ID:  [id number]

## 2017-09-30 NOTE — EKG REPORT
SEVERITY:- ABNORMAL ECG -

SINUS RHYTHM

FIRST DEGREE AV BLOCK

BORDERLINE IVCD WITH LAD

PROBABLE INFERIOR INFARCT, OLD

:

Confirmed by: Jerardo Ulloa 30-Sep-2017 00:11:44

## 2017-10-14 ENCOUNTER — HOSPITAL ENCOUNTER (EMERGENCY)
Dept: HOSPITAL 62 - ER | Age: 67
LOS: 1 days | Discharge: HOME | End: 2017-10-15
Payer: MEDICARE

## 2017-10-14 ENCOUNTER — HOSPITAL ENCOUNTER (EMERGENCY)
Dept: HOSPITAL 62 - ER | Age: 67
Discharge: LEFT BEFORE DISCHARGE | End: 2017-10-14
Payer: MEDICARE

## 2017-10-14 VITALS — DIASTOLIC BLOOD PRESSURE: 90 MMHG | SYSTOLIC BLOOD PRESSURE: 143 MMHG

## 2017-10-14 DIAGNOSIS — R10.10: ICD-10-CM

## 2017-10-14 DIAGNOSIS — Z53.21: Primary | ICD-10-CM

## 2017-10-14 DIAGNOSIS — R07.89: Primary | ICD-10-CM

## 2017-10-14 DIAGNOSIS — J44.9: ICD-10-CM

## 2017-10-14 DIAGNOSIS — I10: ICD-10-CM

## 2017-10-14 DIAGNOSIS — R14.3: ICD-10-CM

## 2017-10-14 DIAGNOSIS — I25.2: ICD-10-CM

## 2017-10-14 DIAGNOSIS — G89.29: ICD-10-CM

## 2017-10-14 DIAGNOSIS — E11.9: ICD-10-CM

## 2017-10-14 DIAGNOSIS — I44.0: ICD-10-CM

## 2017-10-14 PROCEDURE — 84484 ASSAY OF TROPONIN QUANT: CPT

## 2017-10-14 PROCEDURE — 85025 COMPLETE CBC W/AUTO DIFF WBC: CPT

## 2017-10-14 PROCEDURE — 82553 CREATINE MB FRACTION: CPT

## 2017-10-14 PROCEDURE — 71010: CPT

## 2017-10-14 PROCEDURE — 93005 ELECTROCARDIOGRAM TRACING: CPT

## 2017-10-14 PROCEDURE — 93010 ELECTROCARDIOGRAM REPORT: CPT

## 2017-10-14 PROCEDURE — 80053 COMPREHEN METABOLIC PANEL: CPT

## 2017-10-14 PROCEDURE — 36415 COLL VENOUS BLD VENIPUNCTURE: CPT

## 2017-10-14 PROCEDURE — 82962 GLUCOSE BLOOD TEST: CPT

## 2017-10-14 PROCEDURE — 99285 EMERGENCY DEPT VISIT HI MDM: CPT

## 2017-10-14 PROCEDURE — 82550 ASSAY OF CK (CPK): CPT

## 2017-10-15 VITALS — SYSTOLIC BLOOD PRESSURE: 134 MMHG | DIASTOLIC BLOOD PRESSURE: 78 MMHG

## 2017-10-15 LAB
ALBUMIN SERPL-MCNC: 4 G/DL (ref 3.5–5)
ALP SERPL-CCNC: 70 U/L (ref 38–126)
ALT SERPL-CCNC: 44 U/L (ref 21–72)
ANION GAP SERPL CALC-SCNC: 13 MMOL/L (ref 5–19)
AST SERPL-CCNC: 55 U/L (ref 17–59)
BASOPHILS # BLD AUTO: 0 10^3/UL (ref 0–0.2)
BASOPHILS NFR BLD AUTO: 0.5 % (ref 0–2)
BILIRUB DIRECT SERPL-MCNC: 0.4 MG/DL (ref 0–0.4)
BILIRUB SERPL-MCNC: 0.4 MG/DL (ref 0.2–1.3)
BUN SERPL-MCNC: 24 MG/DL (ref 7–20)
CALCIUM: 9.6 MG/DL (ref 8.4–10.2)
CHLORIDE SERPL-SCNC: 103 MMOL/L (ref 98–107)
CK MB SERPL-MCNC: 2.08 NG/ML (ref ?–4.55)
CK SERPL-CCNC: 204 U/L (ref 55–170)
CO2 SERPL-SCNC: 27 MMOL/L (ref 22–30)
CREAT SERPL-MCNC: 1.82 MG/DL (ref 0.52–1.25)
EOSINOPHIL # BLD AUTO: 0.3 10^3/UL (ref 0–0.6)
EOSINOPHIL NFR BLD AUTO: 4.7 % (ref 0–6)
ERYTHROCYTE [DISTWIDTH] IN BLOOD BY AUTOMATED COUNT: 14.4 % (ref 11.5–14)
GLUCOSE SERPL-MCNC: 93 MG/DL (ref 75–110)
HCT VFR BLD CALC: 44.2 % (ref 37.9–51)
HGB BLD-MCNC: 15.2 G/DL (ref 13.5–17)
HGB HCT DIFFERENCE: 1.4
LYMPHOCYTES # BLD AUTO: 1.8 10^3/UL (ref 0.5–4.7)
LYMPHOCYTES NFR BLD AUTO: 27.1 % (ref 13–45)
MCH RBC QN AUTO: 33 PG (ref 27–33.4)
MCHC RBC AUTO-ENTMCNC: 34.4 G/DL (ref 32–36)
MCV RBC AUTO: 96 FL (ref 80–97)
MONOCYTES # BLD AUTO: 0.4 10^3/UL (ref 0.1–1.4)
MONOCYTES NFR BLD AUTO: 6.6 % (ref 3–13)
NEUTROPHILS # BLD AUTO: 4.2 10^3/UL (ref 1.7–8.2)
NEUTS SEG NFR BLD AUTO: 61.1 % (ref 42–78)
POTASSIUM SERPL-SCNC: 4.1 MMOL/L (ref 3.6–5)
PROT SERPL-MCNC: 7.1 G/DL (ref 6.3–8.2)
RBC # BLD AUTO: 4.6 10^6/UL (ref 4.35–5.55)
SODIUM SERPL-SCNC: 142.8 MMOL/L (ref 137–145)
TROPONIN I SERPL-MCNC: 0.01 NG/ML
WBC # BLD AUTO: 6.8 10^3/UL (ref 4–10.5)

## 2017-10-15 NOTE — ER DOCUMENT REPORT
ED General





- General


Chief Complaint: Chest Pain


Stated Complaint: CHEST PRESSURE


Time Seen by Provider: 10/14/17 23:51


TRAVEL OUTSIDE OF THE [place] IN LAST 30 DAYS: No





- Related Data


Allergies/Adverse Reactions: 


 





No Known Allergies Allergy (Verified 10/14/17 00:15)


 











Past Medical History





- Social History


Family History: Reviewed & Not Pertinent


Patient has suicidal ideation: No


Patient has homicidal ideation: No





- Past Medical History


Cardiac Medical History: Reports: Hx Congestive Heart Failure, Hx Heart Attack, 

Hx Hypercholesterolemia, Hx Hypertension


Pulmonary Medical History: Reports: Hx COPD


Endocrine Medical History: Reports: Hx Diabetes Mellitus Type 2 - IDDM


Renal/ Medical History: Reports: Hx Kidney Stones, Hx Renal Insufficiency.  

Denies: Hx Peritoneal Dialysis


GI Medical History: Reports: Hx Gastroesophageal Reflux Disease


Psychiatric Medical History: Reports: Hx Depression


Past Surgical History: Reports: Hx Abdominal Surgery - Nissen fundoplication, 

Hx Cardiac Surgery - cardioMEMS implanted, Hx Cholecystectomy, Hx Tonsillectomy





- Immunizations


Hx Diphtheria, Pertussis, Tetanus Vaccination: Yes


Hx Pneumococcal Vaccination: 01/01/13





Physical Exam





- Vital signs


Vitals: 





 











Temp Pulse Resp BP Pulse Ox


 


 98.5 F   69   16   145/85 H  94 


 


 10/14/17 23:06  10/14/17 23:06  10/14/17 23:06  10/14/17 23:06  10/14/17 23:06














Course





- Re-evaluation


Re-evalutation: 





10/15/17 01:52


Presentation of chest pain in an otherwise well appearing patient. Low clinical 

suspicion for ACS given clinical history, exam, EKG without ST elevations or 

depressions, and negative initial troponin. HEART score less than or equal to 

3. PE also seems unlikely given clinical history, absence of tachycardia or 

dyspnea. Patient is PERC criteria negative. CXR without evidence of 

pneumothorax or pneumonia. No widened mediastinum. Aortic dissection also seems 

unlikely given history, symmetric pulses, CXR, and vitals. 





HEART Score:





History      


ECG      


Age      


Risk Factors   


Troponin   





Total: ***





Chest pain in a patient without evidence of cardiac or other serious etiology 

on workup today. I discussed with patient that, based on their age, risk 

factors and emergency department testing today, the likelihood that their 

symptoms are related to a heart attack is very low (estimated risk of heart 

attack or death over the next 30 days of less than 1%).  The patient 

demonstrates decision making capacity and has verbalized an understanding of 

these risks to me. Based on this,  the patient has chosen to follow-up as an 

outpatient. Usual chest pain return precautions reviewed. The patient states 

understanding and agreement with this plan.





- Vital Signs


Vital signs: 





 











Temp Pulse Resp BP Pulse Ox


 


 98.5 F   69   16   145/85 H  94 


 


 10/14/17 23:06  10/14/17 23:06  10/14/17 23:06  10/14/17 23:06  10/14/17 23:06














- Laboratory


Result Diagrams: 


 10/15/17 00:11





 10/15/17 00:11


Laboratory results interpreted by me: 





 











  10/15/17





  00:11


 


RDW  14.4 H

## 2017-10-15 NOTE — ER DOCUMENT REPORT
ED General





- General


Chief Complaint: Chest Pain


Stated Complaint: CHEST PRESSURE


Time Seen by Provider: 10/14/17 23:51


Notes: 





Patient is a 66-year-old male who presents with intermittent chest pain that is 

chronic in nature.  Nothing is new or different today.  Describes the pain is 

intermittent left-sided chest pain that comes for 15-20 minutes and then 

spontaneously resolves.  Denies any associated nausea, vomiting, shortness of 

breath, or syncope.  He denies any pain at this time.  He is going to follow-up 

with his cardiologist in the next 10 days regarding his intermittent chest 

pain.  He had a cardiac catheterization in December 2016 it was noted to be 

normal.


TRAVEL OUTSIDE OF THE [place] IN LAST 30 DAYS: No





- Related Data


Allergies/Adverse Reactions: 


 





No Known Allergies Allergy (Verified 10/14/17 00:15)


 











Past Medical History





- General


Information source: Patient





- Social History


Smoking Status: Never Smoker


Frequency of alcohol use: None


Drug Abuse: None


Family History: Reviewed & Not Pertinent


Patient has suicidal ideation: No


Patient has homicidal ideation: No





- Past Medical History


Cardiac Medical History: Reports: Hx Congestive Heart Failure, Hx Heart Attack, 

Hx Hypercholesterolemia, Hx Hypertension


Pulmonary Medical History: Reports: Hx COPD


Endocrine Medical History: Reports: Hx Diabetes Mellitus Type 2 - IDDM


Renal/ Medical History: Reports: Hx Kidney Stones, Hx Renal Insufficiency.  

Denies: Hx Peritoneal Dialysis


GI Medical History: Reports: Hx Gastroesophageal Reflux Disease


Psychiatric Medical History: Reports: Hx Depression


Past Surgical History: Reports: Hx Abdominal Surgery - Nissen fundoplication, 

Hx Cardiac Surgery - cardioMEMS implanted, Hx Cholecystectomy, Hx Tonsillectomy





- Immunizations


Hx Diphtheria, Pertussis, Tetanus Vaccination: Yes


Hx Pneumococcal Vaccination: 01/01/13





Review of Systems





- Review of Systems


Notes: 





Constitutional: Negative for fever.


HENT: Negative for sore throat.


Eyes: Negative for visual changes.


Cardiovascular: Positive for chest pain.


Respiratory: Negative for shortness of breath.


Gastrointestinal: Negative for abdominal pain, vomiting or diarrhea.


Genitourinary: Negative for dysuria.


Musculoskeletal: Negative for back pain.


Skin: Negative for rash.


Neurological: Negative for headaches, weakness or numbness.





10 point ROS negative except as marked above and in HPI.





Physical Exam





- Vital signs


Vitals: 


 











Temp Pulse Resp BP Pulse Ox


 


 98.5 F   69   16   145/85 H  94 


 


 10/14/17 23:06  10/14/17 23:06  10/14/17 23:06  10/14/17 23:06  10/14/17 23:06











Interpretation: Hypertensive


Notes: 





PHYSICAL EXAMINATION:





GENERAL: Well-appearing, well-nourished and in no acute distress.





HEAD: Atraumatic, normocephalic.





EYES: Pupils equal round and reactive to light, extraocular movements intact, 

sclera anicteric, conjunctiva are normal.





ENT: nares patent, oropharynx clear without exudates.  Moist mucous membranes.





NECK: Normal range of motion, supple without lymphadenopathy





LUNGS: Breath sounds clear to auscultation bilaterally and equal.  No wheezes 

rales or rhonchi.





HEART: Regular rate and rhythm without murmurs





ABDOMEN: Soft, nontender, normoactive bowel sounds.  No guarding, no rebound.  

No masses appreciated.





EXTREMITIES: Normal range of motion, no pitting or edema.  No cyanosis.





NEUROLOGICAL: No focal neurological deficits. Moves all extremities 

spontaneously and on command.





PSYCH: Normal mood, normal affect.





SKIN: Warm, Dry, normal turgor, no rashes or lesions noted.





Course





- Re-evaluation


Re-evalutation: 





10/15/17 01:53


Presentation of chest pain in an otherwise well appearing patient. Low clinical 

suspicion for ACS given clinical history, exam, EKG without ST elevations or 

depressions, and negative initial troponin.  PE also seems unlikely given 

clinical history, absence of tachycardia or dyspnea. Well's score is 0. CXR 

without evidence of pneumothorax or pneumonia. No widened mediastinum. Aortic 

dissection also seems unlikely given history, symmetric pulses, CXR, and 

vitals.  Patient's clinical history seems to be much more consistent with 

likely upper intestinal irritation as he knows that he has felt gaseous and was 

like he needs to have a bowel movement or passed flatus and when he does so the 

pain seems to go away.  He is pain-free at time of presentation.  Will repeat a 

second troponin 3 hours from the initial if this remains normal plan for 

discharge home.  Patient is agreeable to this and his follow-up with his 

cardiologist in the next 10 days for a stress test.


10/15/17 04:38


Second troponin remains normal.  Patient did have some mild upper abdominal 

pain which has resolved after GI cocktail.  He remains without any chest pain, 

shortness of breath, diaphoresis or vomiting.  At this time will discharge with 

return precautions and follow-up recommendations.  Verbal discharge 

instructions given a the bedside and opportunity for questions given. 

Medication warnings reviewed. Patient is in agreement with this plan and has 

verbalized understanding of return precautions and the need for primary care 

follow-up in the next 24-72 hours.





- Vital Signs


Vital signs: 


 











Temp Pulse Resp BP Pulse Ox


 


 98.5 F   69   13   134/78 H  93 


 


 10/14/17 23:06  10/14/17 23:06  10/15/17 04:02  10/15/17 04:02  10/15/17 04:02














- Laboratory


Result Diagrams: 


 10/15/17 00:11





 10/15/17 01:23


Laboratory results interpreted by me: 


 











  10/15/17 10/15/17





  00:11 01:23


 


RDW  14.4 H 


 


BUN   24 H


 


Creatinine   1.82 H


 


Est GFR ( Amer)   45 L


 


Est GFR (Non-Af Amer)   37 L


 


Creatine Kinase   204 H














- Diagnostic Test


Radiology reviewed: Image reviewed, Reports reviewed


Radiology results interpreted by me: 





10/15/17 01:54


Chest x-ray: No acute infiltrate or pneumothorax





- EKG Interpretation by Me


Additional EKG results interpreted by me: 





10/15/17 01:54


Sinus rhythm.  Rate 66.  No ST elevations or depressions.  First-degree AV 

block present.  PVCs.  Unchanged from prior.





Discharge





- Discharge


Clinical Impression: 


Chest pain


Qualifiers:


 Chest pain type: unspecified Qualified Code(s): R07.9 - Chest pain, unspecified





Condition: Good


Disposition: HOME, SELF-CARE


Additional Instructions: 


You were seen today for chest pain.  The exact cause of your pain is unclear. 

However, based on your cardiac enzyme testing, chest x-ray, and EKG it does not 

appear that it is from an immediately life-threatening cause at this time.  

Although your testing here is normal is critical that you follow-up with your 

primary care physician for continued evaluation of this chest pain and possible 

stress testing.  I recommended you see your physician within the next 24-48 

hours to be evaluated for consideration of a stress test.  Please return to 

emergency department immediately if you have worsening of your chest pain, 

shortness of breath, vomiting, become unable to exert yourself due to pain or 

difficulty breathing, you pass out, or have any pain that radiates into your 

arms, jaw, or back. Please also return if you have any additional symptoms that 

are concerning to you.

## 2017-10-15 NOTE — EKG REPORT
SEVERITY:- ABNORMAL ECG -

SINUS RHYTHM

VENTRICULAR PREMATURE COMPLEX

FIRST DEGREE AV BLOCK

NONSPECIFIC INTRAVENTRICULAR CONDUCTION DELAY

PROBABLE INFERIOR INFARCT, AGE INDETERMINATE

CONSIDER OLD ANTERIOR MI

:

Confirmed by: Melquiades Baird MD 15-Oct-2017 07:57:35

## 2017-10-15 NOTE — RADIOLOGY REPORT (SQ)
EXAM DESCRIPTION:  CHEST SINGLE VIEW



COMPLETED DATE/TIME:  10/14/2017 11:56 pm



REASON FOR STUDY:  cp



COMPARISON:  9/29/2017



EXAM PARAMETERS:  NUMBER OF VIEWS: One view.

TECHNIQUE: Single frontal radiographic view of the chest acquired.

RADIATION DOSE: NA

LIMITATIONS: None.



FINDINGS:  LUNGS AND PLEURA: No opacities, masses or pneumothorax. No pleural effusion.

MEDIASTINUM AND HILAR STRUCTURES: No masses.  Contour normal.

HEART AND VASCULAR STRUCTURES: Heart normal in size.  Normal vasculature.

BONES: No acute findings.

HARDWARE: Surgical device projects over the right hilum.  Surgical clips are seen in the region of th
e gastroesophageal junction.

OTHER: No other significant finding.



IMPRESSION:  NO ACUTE RADIOGRAPHIC FINDING IN THE CHEST.



TECHNICAL DOCUMENTATION:  JOB ID:  [id number]

## 2017-12-07 ENCOUNTER — HOSPITAL ENCOUNTER (OUTPATIENT)
Dept: HOSPITAL 62 - OD | Age: 67
End: 2017-12-07
Attending: PHYSICIAN ASSISTANT
Payer: MEDICARE

## 2017-12-07 DIAGNOSIS — N18.3: ICD-10-CM

## 2017-12-07 DIAGNOSIS — I12.9: Primary | ICD-10-CM

## 2017-12-07 DIAGNOSIS — R80.9: ICD-10-CM

## 2017-12-07 DIAGNOSIS — E11.9: ICD-10-CM

## 2017-12-07 LAB
ANION GAP SERPL CALC-SCNC: 13 MMOL/L (ref 5–19)
APPEARANCE UR: CLEAR
BILIRUB UR QL STRIP: NEGATIVE
BUN SERPL-MCNC: 22 MG/DL (ref 7–20)
CALCIUM: 9.7 MG/DL (ref 8.4–10.2)
CHLORIDE SERPL-SCNC: 101 MMOL/L (ref 98–107)
CO2 SERPL-SCNC: 32 MMOL/L (ref 22–30)
CREAT SERPL-MCNC: 2.05 MG/DL (ref 0.52–1.25)
ERYTHROCYTE [DISTWIDTH] IN BLOOD BY AUTOMATED COUNT: 14.4 % (ref 11.5–14)
GLUCOSE SERPL-MCNC: 106 MG/DL (ref 75–110)
GLUCOSE UR STRIP-MCNC: NEGATIVE MG/DL
HCT VFR BLD CALC: 45 % (ref 37.9–51)
HGB BLD-MCNC: 14.9 G/DL (ref 13.5–17)
HGB HCT DIFFERENCE: -0.3
KETONES UR STRIP-MCNC: NEGATIVE MG/DL
MCH RBC QN AUTO: 32.4 PG (ref 27–33.4)
MCHC RBC AUTO-ENTMCNC: 33.2 G/DL (ref 32–36)
MCV RBC AUTO: 98 FL (ref 80–97)
NITRITE UR QL STRIP: NEGATIVE
PH UR STRIP: 6 [PH] (ref 5–9)
POTASSIUM SERPL-SCNC: 4.7 MMOL/L (ref 3.6–5)
PROT UR STRIP-MCNC: NEGATIVE MG/DL
RBC # BLD AUTO: 4.61 10^6/UL (ref 4.35–5.55)
SODIUM SERPL-SCNC: 145.5 MMOL/L (ref 137–145)
SP GR UR STRIP: 1.02
UROBILINOGEN UR-MCNC: NEGATIVE MG/DL (ref ?–2)
WBC # BLD AUTO: 6.2 10^3/UL (ref 4–10.5)

## 2017-12-07 PROCEDURE — 80048 BASIC METABOLIC PNL TOTAL CA: CPT

## 2017-12-07 PROCEDURE — 82570 ASSAY OF URINE CREATININE: CPT

## 2017-12-07 PROCEDURE — 82043 UR ALBUMIN QUANTITATIVE: CPT

## 2017-12-07 PROCEDURE — 85027 COMPLETE CBC AUTOMATED: CPT

## 2017-12-07 PROCEDURE — 81001 URINALYSIS AUTO W/SCOPE: CPT

## 2017-12-07 PROCEDURE — 36415 COLL VENOUS BLD VENIPUNCTURE: CPT

## 2017-12-09 LAB
CREAT UR-MCNC: 225.3 MG/DL
MICROALBUMIN UR-MCNC: 66.5 UG/ML

## 2018-06-19 ENCOUNTER — HOSPITAL ENCOUNTER (OUTPATIENT)
Dept: HOSPITAL 62 - OD | Age: 68
End: 2018-06-19
Attending: PHYSICIAN ASSISTANT
Payer: MEDICARE

## 2018-06-19 DIAGNOSIS — R80.9: ICD-10-CM

## 2018-06-19 DIAGNOSIS — E11.22: Primary | ICD-10-CM

## 2018-06-19 DIAGNOSIS — N18.3: ICD-10-CM

## 2018-06-19 DIAGNOSIS — I12.9: ICD-10-CM

## 2018-06-19 LAB
ANION GAP SERPL CALC-SCNC: 13 MMOL/L (ref 5–19)
APPEARANCE UR: CLEAR
APTT PPP: YELLOW S
BILIRUB UR QL STRIP: NEGATIVE
BUN SERPL-MCNC: 15 MG/DL (ref 7–20)
CALCIUM: 9.7 MG/DL (ref 8.4–10.2)
CHLORIDE SERPL-SCNC: 103 MMOL/L (ref 98–107)
CO2 SERPL-SCNC: 31 MMOL/L (ref 22–30)
ERYTHROCYTE [DISTWIDTH] IN BLOOD BY AUTOMATED COUNT: 14.1 % (ref 11.5–14)
GLUCOSE SERPL-MCNC: 110 MG/DL (ref 75–110)
GLUCOSE UR STRIP-MCNC: NEGATIVE MG/DL
HCT VFR BLD CALC: 45.5 % (ref 37.9–51)
HGB BLD-MCNC: 15.1 G/DL (ref 13.5–17)
KETONES UR STRIP-MCNC: NEGATIVE MG/DL
MCH RBC QN AUTO: 32 PG (ref 27–33.4)
MCHC RBC AUTO-ENTMCNC: 33.3 G/DL (ref 32–36)
MCV RBC AUTO: 96 FL (ref 80–97)
NITRITE UR QL STRIP: NEGATIVE
PH UR STRIP: 7 [PH] (ref 5–9)
PHOSPHATE SERPL-MCNC: 3 MG/DL (ref 2.5–4.5)
PLATELET # BLD: 210 10^3/UL (ref 150–450)
POTASSIUM SERPL-SCNC: 5 MMOL/L (ref 3.6–5)
PROT UR STRIP-MCNC: 100 MG/DL
RBC # BLD AUTO: 4.73 10^6/UL (ref 4.35–5.55)
SODIUM SERPL-SCNC: 146.9 MMOL/L (ref 137–145)
SP GR UR STRIP: 1.02
UROBILINOGEN UR-MCNC: NEGATIVE MG/DL (ref ?–2)
WBC # BLD AUTO: 5 10^3/UL (ref 4–10.5)

## 2018-06-19 PROCEDURE — 36415 COLL VENOUS BLD VENIPUNCTURE: CPT

## 2018-06-19 PROCEDURE — 85027 COMPLETE CBC AUTOMATED: CPT

## 2018-06-19 PROCEDURE — 84100 ASSAY OF PHOSPHORUS: CPT

## 2018-06-19 PROCEDURE — 81001 URINALYSIS AUTO W/SCOPE: CPT

## 2018-06-19 PROCEDURE — 83970 ASSAY OF PARATHORMONE: CPT

## 2018-06-19 PROCEDURE — 80048 BASIC METABOLIC PNL TOTAL CA: CPT

## 2018-07-08 ENCOUNTER — HOSPITAL ENCOUNTER (EMERGENCY)
Dept: HOSPITAL 62 - ER | Age: 68
Discharge: HOME | End: 2018-07-08
Payer: MEDICARE

## 2018-07-08 VITALS — SYSTOLIC BLOOD PRESSURE: 125 MMHG | DIASTOLIC BLOOD PRESSURE: 87 MMHG

## 2018-07-08 DIAGNOSIS — I50.9: ICD-10-CM

## 2018-07-08 DIAGNOSIS — J44.9: ICD-10-CM

## 2018-07-08 DIAGNOSIS — Z87.442: ICD-10-CM

## 2018-07-08 DIAGNOSIS — Z90.49: ICD-10-CM

## 2018-07-08 DIAGNOSIS — I25.2: ICD-10-CM

## 2018-07-08 DIAGNOSIS — E11.9: ICD-10-CM

## 2018-07-08 DIAGNOSIS — I11.0: ICD-10-CM

## 2018-07-08 DIAGNOSIS — M54.5: ICD-10-CM

## 2018-07-08 DIAGNOSIS — K59.00: Primary | ICD-10-CM

## 2018-07-08 LAB
ADD MANUAL DIFF: NO
ALBUMIN SERPL-MCNC: 3.5 G/DL (ref 3.5–5)
ALP SERPL-CCNC: 63 U/L (ref 38–126)
ALT SERPL-CCNC: 38 U/L (ref 21–72)
ANION GAP SERPL CALC-SCNC: 11 MMOL/L (ref 5–19)
APPEARANCE UR: CLEAR
APTT PPP: YELLOW S
AST SERPL-CCNC: 43 U/L (ref 17–59)
BASOPHILS # BLD AUTO: 0.1 10^3/UL (ref 0–0.2)
BASOPHILS NFR BLD AUTO: 1.2 % (ref 0–2)
BILIRUB DIRECT SERPL-MCNC: 0.2 MG/DL (ref 0–0.4)
BILIRUB SERPL-MCNC: 0.2 MG/DL (ref 0.2–1.3)
BILIRUB UR QL STRIP: NEGATIVE
BUN SERPL-MCNC: 18 MG/DL (ref 7–20)
CALCIUM: 9.1 MG/DL (ref 8.4–10.2)
CHLORIDE SERPL-SCNC: 104 MMOL/L (ref 98–107)
CO2 SERPL-SCNC: 32 MMOL/L (ref 22–30)
EOSINOPHIL # BLD AUTO: 0.3 10^3/UL (ref 0–0.6)
EOSINOPHIL NFR BLD AUTO: 5.9 % (ref 0–6)
ERYTHROCYTE [DISTWIDTH] IN BLOOD BY AUTOMATED COUNT: 14.2 % (ref 11.5–14)
GLUCOSE SERPL-MCNC: 95 MG/DL (ref 75–110)
GLUCOSE UR STRIP-MCNC: NEGATIVE MG/DL
HCT VFR BLD CALC: 42.9 % (ref 37.9–51)
HGB BLD-MCNC: 14.4 G/DL (ref 13.5–17)
KETONES UR STRIP-MCNC: NEGATIVE MG/DL
LYMPHOCYTES # BLD AUTO: 1.5 10^3/UL (ref 0.5–4.7)
LYMPHOCYTES NFR BLD AUTO: 30.5 % (ref 13–45)
MCH RBC QN AUTO: 31.8 PG (ref 27–33.4)
MCHC RBC AUTO-ENTMCNC: 33.5 G/DL (ref 32–36)
MCV RBC AUTO: 95 FL (ref 80–97)
MONOCYTES # BLD AUTO: 0.3 10^3/UL (ref 0.1–1.4)
MONOCYTES NFR BLD AUTO: 5.5 % (ref 3–13)
NEUTROPHILS # BLD AUTO: 2.8 10^3/UL (ref 1.7–8.2)
NEUTS SEG NFR BLD AUTO: 56.9 % (ref 42–78)
NITRITE UR QL STRIP: NEGATIVE
PH UR STRIP: 5 [PH] (ref 5–9)
PLATELET # BLD: 193 10^3/UL (ref 150–450)
POTASSIUM SERPL-SCNC: 4 MMOL/L (ref 3.6–5)
PROT SERPL-MCNC: 6.4 G/DL (ref 6.3–8.2)
PROT UR STRIP-MCNC: 30 MG/DL
RBC # BLD AUTO: 4.52 10^6/UL (ref 4.35–5.55)
SODIUM SERPL-SCNC: 147 MMOL/L (ref 137–145)
SP GR UR STRIP: 1.02
TOTAL CELLS COUNTED % (AUTO): 100 %
UROBILINOGEN UR-MCNC: NEGATIVE MG/DL (ref ?–2)
WBC # BLD AUTO: 5 10^3/UL (ref 4–10.5)

## 2018-07-08 PROCEDURE — 81001 URINALYSIS AUTO W/SCOPE: CPT

## 2018-07-08 PROCEDURE — 76380 CAT SCAN FOLLOW-UP STUDY: CPT

## 2018-07-08 PROCEDURE — 85025 COMPLETE CBC W/AUTO DIFF WBC: CPT

## 2018-07-08 PROCEDURE — 80053 COMPREHEN METABOLIC PANEL: CPT

## 2018-07-08 PROCEDURE — 99284 EMERGENCY DEPT VISIT MOD MDM: CPT

## 2018-07-08 PROCEDURE — 36415 COLL VENOUS BLD VENIPUNCTURE: CPT

## 2018-07-08 PROCEDURE — 96374 THER/PROPH/DIAG INJ IV PUSH: CPT

## 2018-07-08 NOTE — RADIOLOGY REPORT (SQ)
EXAM DESCRIPTION: 



CT abdomen and pelvis without contrast7/8/2018 1:28 AM CDT



CLINICAL HISTORY: 



67 years, Male, rt back/flank pain



COMPARISON: 



None.



TECHNIQUE: 



Volumetric CT acquisition was performed through the abdomen and

pelvis. Images in the axial and coronal planes were presented for

interpretation

This exam was performed according to our departmental

dose-optimization program, which includes automated exposure

control, adjustment of the mA and/or kV according to patient size

and/or use of iterative reconstruction technique.



FINDINGS: 



The visualized portions of the lung bases are clear. The

cardiomediastinal structures are within normal limits.





Within the upper abdomen, the liver and spleen are normal in size

and morphology.

The gallbladder is surgically absent. The intra/extrahepatic

biliary tree is normal in appearance. The pancreas and adrenal

glands are normal. 



The right kidney is normal in size and the right ureter is normal

in course and caliber. There are no right renal calculi, distal

obstructing stones, or evidence of hydronephrosis/hydroureter. 



The left kidney is normal in size and the left ureter is normal

in course and caliber. There are no left renal calculi, distal

obstructing stones, or evidence of hydronephrosis/hydroureter.



There are surgical changes from prior hiatal hernia repair.

The stomach and small intestines are within normal limits without

evidence of bowel dilation or wall thickening. 

The appendix is well visualized and normal. 



The colon is stool filled and unremarkable.



Within the pelvis, the bladder and rectum are normal. The

prostate is age-appropriate



There are no pathologically enlarged inguinal, retroperitoneal,

portacaval, or mesenteric lymph nodes.

The soft tissue structures of the abdominal wall are normal.

The visualized osseous structures are within normal limits for

the patient's age. There are degenerative changes of the lower

lumbar spine with facet hypertrophy most pronounced at the L4/L5

and L5/S1 levels. 





The abdominal aorta and its primary branches are normal in course

and caliber. 

Limited evaluation of the venous structures demonstrates no gross

abnormalities.



IMPRESSION: 



1. No acute intra-abdominal process or clear cause for the

patient's right-sided abdominal pain.

2. Status post cholecystectomy.

## 2018-07-08 NOTE — ER DOCUMENT REPORT
ED General





- General


Stated Complaint: BACK PAIN


Time Seen by Provider: 07/08/18 01:37


TRAVEL OUTSIDE OF THE [place] IN LAST 30 DAYS: No





- HPI


Notes: 





Patient is a 59-year-old male with a history of hypertension, type 2 diabetes, 

anxiety/depression, chronic LBP who presents to the ED complaining of acute on 

chronic low back pain.  Patient states that he has had more noticeable back 

pain over the last 3 months that increased over the last 2 days with occ 

radiation around his right side.  Patient states that twisting and movements 

make his back pain worse.  He is still eating and drinking without any 

difficulties.  He is urinating normally and having normal bowel movements; 

although, he reports having issues with constipation.  Last BM yesterday.  

Patient states that he does not have any abdominal pain but rather a tightness 

that is generalized.  He denies any drug allergies.  Denies any headache, fever

, neck pain, URI, sore throat, chest pain, palpitations, syncope, cough, 

shortness of breath, wheeze, dyspnea, abdominal pain, nausea/vomiting/diarrhea, 

urinary retention, dysuria, hematuria, loss of control of bowel or bladder, 

numbness/tingling, saddle anesthesia, muscle paralysis/weakness, or rash.





- Related Data


Allergies/Adverse Reactions: 


 





No Known Allergies Allergy (Verified 10/14/17 00:15)


 











Past Medical History





- Social History


Smoking Status: Never Smoker


Family History: Reviewed & Not Pertinent





- Past Medical History


Cardiac Medical History: Reports: Hx Congestive Heart Failure, Hx Heart Attack, 

Hx Hypercholesterolemia, Hx Hypertension


Pulmonary Medical History: Reports: Hx COPD


Endocrine Medical History: Reports: Hx Diabetes Mellitus Type 2 - IDDM


Renal/ Medical History: Reports: Hx Kidney Stones, Hx Renal Insufficiency.  

Denies: Hx Peritoneal Dialysis


GI Medical History: Reports: Hx Gastroesophageal Reflux Disease


Psychiatric Medical History: Reports: Hx Depression


Past Surgical History: Reports: Hx Abdominal Surgery - Nissen fundoplication, 

Hx Cardiac Surgery - cardioMEMS implanted, Hx Cholecystectomy, Hx Tonsillectomy





- Immunizations


Hx Diphtheria, Pertussis, Tetanus Vaccination: Yes


Hx Pneumococcal Vaccination: 01/01/13





Review of Systems





- Review of Systems


-: Yes All other systems reviewed and negative





Physical Exam





- Vital signs


Vitals: 


 











Temp Pulse Resp BP Pulse Ox


 


 98.6 F   73   18   152/82 H  95 


 


 07/08/18 01:36  07/08/18 01:36  07/08/18 01:36  07/08/18 01:36  07/08/18 01:36














- Notes


Notes: 





PHYSICAL EXAMINATION:





GENERAL: Well-appearing, well-nourished and in no acute distress. 





LUNGS: Breath sounds clear to auscultation bilaterally and equal.  No wheezes 

rales or rhonchi.





HEART: Regular rate and rhythm without murmurs, rubs, gallops.





ABDOMEN: Soft, nontender, nondistended abdomen.  No guarding, no rebound.  

Normal bowel sounds present.  No CVA tenderness bilaterally.  No obvious 

pulsatile mass, but pt is obese.





Musculoskeletal: LE's b/l:  FROM to passive/active. Strength 5+/5.  No deficits 

noted.  No bony tenderness of extremities.





Back:  FROM to passive/active.  Strength 5+/5.  No vertebral point tenderness, 

stepoffs, or deformities.  No other bony tenderness, erythema, swelling, or 

ecchymosis.  SLR negative b/l.  No SI jt tenderness.  No foot drop





Extremities:  No cyanosis, clubbing, or edema b/l.  Peripheral pulses 2+.  

Capillary refill less than 2 seconds.





NEUROLOGICAL: Normal speech, normal gait.  Normal sensory, motor exams.  

Reflexes 2+ b/l.  





PSYCH: Normal mood, normal affect.





SKIN: Warm, Dry, normal turgor, no rashes or lesions noted.





Course





- Re-evaluation


Re-evalutation: 





07/08/18 05:40


Patient is an afebrile, well-hydrated, 67-year-old male who presents to the ED 

with acute on chronic low back pain and constipation.  Vitals are acceptable.  

PE is otherwise unremarkable for any focal neurological deficits.  CT abd/pelv 

unremarkable for acute pathology.  Patient was given Toradol.  Pt is feeling 

better.  He has no significant tachycardia, tachypnea, or hypoxia.  He is 

nontoxic-appearing and is tolerating p.o. without difficulties.  There are no 

signs of infection.  No other red flag symptoms noted.  No other labs or 

imaging warranted at this time based on H&P.  Low suspicion for any acute 

abdomen, meningitis, fracture, expanding/ruptured AAA, cauda equina syndrome, 

epidural mass lesion/abscess, herniated disc causing severe spinal stenosis, or 

other systemic infection at this time.  Patient is aware that his condition can 

change from initial presentation and that he needs monitor symptoms closely for 

any acute changes.  I will send him home with a prescription for mag citrate, 

baclofen.  Conservative measures otherwise for symptoms.  Recheck with your PCM 

in 2-3 days.  Consider consult with GI/orthopedic/physical therapy.  Return to 

the ED with any worsening/concerning symptoms otherwise as reviewed discharge.  

Patient is in agreement.














- Vital Signs


Vital signs: 


 











Temp Pulse Resp BP Pulse Ox


 


 98.6 F   73   18   152/82 H  95 


 


 07/08/18 01:36  07/08/18 01:36  07/08/18 01:36  07/08/18 01:36  07/08/18 01:36














- Laboratory


Result Diagrams: 


 07/08/18 02:30





 07/08/18 04:34


Laboratory results interpreted by me: 


 











  07/08/18 07/08/18 07/08/18





  02:30 02:44 04:34


 


RDW  14.2 H  


 


Sodium    147.0 H


 


Carbon Dioxide    32 H


 


Creatinine    1.71 H


 


Est GFR ( Amer)    48 L


 


Est GFR (Non-Af Amer)    40 L


 


Urine Protein   30 H 














Discharge





- Discharge


Clinical Impression: 


Low back pain


Qualifiers:


 Chronicity: acute Back pain laterality: bilateral Sciatica presence: without 

sciatica Qualified Code(s): M54.5 - Low back pain





Constipation


Qualifiers:


 Constipation type: unspecified constipation type Qualified Code(s): K59.00 - 

Constipation, unspecified





Condition: Stable


Disposition: HOME, SELF-CARE


Instructions:  Constipation ([facility]), Low Back Pain ([facility]), Muscle Relaxers ([facility]), 

Stretching Exercises for the Back ([facility])


Additional Instructions: 


Rest, Ice


Tylenol/ibuprofen as needed


Light stretches daily


Strength exercises as able


Moist heat and massage may help


F/u with your PCP in 2-3 days for a recheck


Consider consult(s) with Orthopedics/physical therapy for ongoing/worsening 

symptoms





Return to the ED with any worsening symptoms and/or development of fever, 

headache, chest pain, palpitations, syncope, shortness of breath, trouble 

breathing, abdominal pain, n/v/d, blood in stool/urine, loss of control of bowel

/bladder, urinary retention, muscle weakness/paralysis, saddle anesthesia, 

numbness/tingling, or other worsening symptoms that are concerning to you.


Prescriptions: 


Baclofen [Baclofen 10 mg Tablet] 5 - 10 mg PO BID PRN #10 tablet


 PRN Reason: 


Magnesium Citrate [Citrate of Magnesia 296 ml Bottle] 296 ml PO ONCE PRN #1 

bottle


 PRN Reason: 


Forms:  Elevated Blood Pressure


Referrals: 


GURWINDER HOLLAND PA-C [ALLIED HEALTH PROFESSIONAL] - 07/09/18


[facility] [Provider Group] - Follow up as needed


JITENDRA RIVERA MD [ACTIVE STAFF] - Follow up as needed

## 2018-07-17 ENCOUNTER — HOSPITAL ENCOUNTER (EMERGENCY)
Dept: HOSPITAL 62 - ER | Age: 68
Discharge: HOME | End: 2018-07-17
Payer: MEDICARE

## 2018-07-17 VITALS — SYSTOLIC BLOOD PRESSURE: 149 MMHG | DIASTOLIC BLOOD PRESSURE: 87 MMHG

## 2018-07-17 DIAGNOSIS — E78.00: ICD-10-CM

## 2018-07-17 DIAGNOSIS — E11.9: ICD-10-CM

## 2018-07-17 DIAGNOSIS — J44.9: ICD-10-CM

## 2018-07-17 DIAGNOSIS — Z90.49: ICD-10-CM

## 2018-07-17 DIAGNOSIS — Z87.442: ICD-10-CM

## 2018-07-17 DIAGNOSIS — R53.1: ICD-10-CM

## 2018-07-17 DIAGNOSIS — I25.2: ICD-10-CM

## 2018-07-17 DIAGNOSIS — S39.011A: Primary | ICD-10-CM
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
## 2018-07-17 DIAGNOSIS — I50.9: ICD-10-CM

## 2018-07-17 DIAGNOSIS — Z79.4: ICD-10-CM

## 2018-07-17 DIAGNOSIS — I11.0: ICD-10-CM

## 2018-07-17 DIAGNOSIS — M47.816: ICD-10-CM

## 2018-07-17 LAB
ADD MANUAL DIFF: NO
ALBUMIN SERPL-MCNC: 4.1 G/DL (ref 3.5–5)
ALP SERPL-CCNC: 72 U/L (ref 38–126)
ALT SERPL-CCNC: 46 U/L (ref 21–72)
ANION GAP SERPL CALC-SCNC: 13 MMOL/L (ref 5–19)
APPEARANCE UR: CLEAR
APTT PPP: YELLOW S
AST SERPL-CCNC: 57 U/L (ref 17–59)
BASOPHILS # BLD AUTO: 0 10^3/UL (ref 0–0.2)
BASOPHILS NFR BLD AUTO: 0.5 % (ref 0–2)
BILIRUB DIRECT SERPL-MCNC: 0.2 MG/DL (ref 0–0.4)
BILIRUB SERPL-MCNC: 0.4 MG/DL (ref 0.2–1.3)
BILIRUB UR QL STRIP: NEGATIVE
BUN SERPL-MCNC: 19 MG/DL (ref 7–20)
CALCIUM: 9 MG/DL (ref 8.4–10.2)
CHLORIDE SERPL-SCNC: 104 MMOL/L (ref 98–107)
CO2 SERPL-SCNC: 29 MMOL/L (ref 22–30)
CRP SERPL-MCNC: 5.5 MG/L (ref ?–10)
EOSINOPHIL # BLD AUTO: 0.2 10^3/UL (ref 0–0.6)
EOSINOPHIL NFR BLD AUTO: 3.6 % (ref 0–6)
ERYTHROCYTE [DISTWIDTH] IN BLOOD BY AUTOMATED COUNT: 14.1 % (ref 11.5–14)
GLUCOSE SERPL-MCNC: 100 MG/DL (ref 75–110)
GLUCOSE UR STRIP-MCNC: NEGATIVE MG/DL
HCT VFR BLD CALC: 44.5 % (ref 37.9–51)
HGB BLD-MCNC: 14.8 G/DL (ref 13.5–17)
KETONES UR STRIP-MCNC: NEGATIVE MG/DL
LIPASE SERPL-CCNC: 70.2 U/L (ref 23–300)
LYMPHOCYTES # BLD AUTO: 1.6 10^3/UL (ref 0.5–4.7)
LYMPHOCYTES NFR BLD AUTO: 30.2 % (ref 13–45)
MCH RBC QN AUTO: 31.5 PG (ref 27–33.4)
MCHC RBC AUTO-ENTMCNC: 33.2 G/DL (ref 32–36)
MCV RBC AUTO: 95 FL (ref 80–97)
MONOCYTES # BLD AUTO: 0.4 10^3/UL (ref 0.1–1.4)
MONOCYTES NFR BLD AUTO: 7.4 % (ref 3–13)
NEUTROPHILS # BLD AUTO: 3 10^3/UL (ref 1.7–8.2)
NEUTS SEG NFR BLD AUTO: 58.3 % (ref 42–78)
NITRITE UR QL STRIP: NEGATIVE
PH UR STRIP: 5 [PH] (ref 5–9)
PLATELET # BLD: 206 10^3/UL (ref 150–450)
POTASSIUM SERPL-SCNC: 4.4 MMOL/L (ref 3.6–5)
PROT SERPL-MCNC: 7.1 G/DL (ref 6.3–8.2)
PROT UR STRIP-MCNC: 30 MG/DL
RBC # BLD AUTO: 4.68 10^6/UL (ref 4.35–5.55)
SODIUM SERPL-SCNC: 145.8 MMOL/L (ref 137–145)
SP GR UR STRIP: 1.02
TOTAL CELLS COUNTED % (AUTO): 100 %
UROBILINOGEN UR-MCNC: 2 MG/DL (ref ?–2)
WBC # BLD AUTO: 5.2 10^3/UL (ref 4–10.5)

## 2018-07-17 PROCEDURE — 80053 COMPREHEN METABOLIC PANEL: CPT

## 2018-07-17 PROCEDURE — 81001 URINALYSIS AUTO W/SCOPE: CPT

## 2018-07-17 PROCEDURE — 99285 EMERGENCY DEPT VISIT HI MDM: CPT

## 2018-07-17 PROCEDURE — 36415 COLL VENOUS BLD VENIPUNCTURE: CPT

## 2018-07-17 PROCEDURE — 83735 ASSAY OF MAGNESIUM: CPT

## 2018-07-17 PROCEDURE — 86140 C-REACTIVE PROTEIN: CPT

## 2018-07-17 PROCEDURE — 83690 ASSAY OF LIPASE: CPT

## 2018-07-17 PROCEDURE — 85652 RBC SED RATE AUTOMATED: CPT

## 2018-07-17 PROCEDURE — 85025 COMPLETE CBC W/AUTO DIFF WBC: CPT

## 2018-07-17 NOTE — ER DOCUMENT REPORT
ED Neck/Back Problem





<SILVIO CHAVIRA - Last Filed: 07/17/18 14:07>





- General


Mode of Arrival: Ambulatory


Information source: Patient


TRAVEL OUTSIDE OF THE [place] IN LAST 30 DAYS: No





<WILIAN ESCALANTE - Last Filed: 07/17/18 14:54>





- General


Chief Complaint: Flank Pain


Stated Complaint: WEAKNESS


Time Seen by Provider: 07/17/18 10:52


Notes: 





67-year-old male that presents to the emergency department today with 

complaints of lower back pain that radiates around his left side and around to 

his abdomen.  Patient states he has been having these symptoms for a few 

months.  Patient states the symptoms are exacerbated with movement, twisting, 

and turning.  Patient ambulates with a walker. (WILIAN ESCALANTE)





- Related Data


Allergies/Adverse Reactions: 


 





No Known Allergies Allergy (Verified 10/14/17 00:15)


 











Past Medical History





- General


Information source: Patient





- Social History


Smoking Status: Never Smoker


Cigarette use (# per day): No


Frequency of alcohol use: None


Drug Abuse: None


Lives with: Family


Family History: Reviewed & Not Pertinent


Patient has suicidal ideation: No


Patient has homicidal ideation: No





- Past Medical History


Cardiac Medical History: Reports: Hx Congestive Heart Failure, Hx Heart Attack, 

Hx Hypercholesterolemia, Hx Hypertension


Pulmonary Medical History: Reports: Hx COPD


Endocrine Medical History: Reports: Hx Diabetes Mellitus Type 2 - IDDM


Renal/ Medical History: Reports: Hx Kidney Stones, Hx Renal Insufficiency


GI Medical History: Reports: Hx Gastroesophageal Reflux Disease


Psychiatric Medical History: Reports: Hx Depression


Past Surgical History: Reports: Hx Abdominal Surgery - Nissen fundoplication, 

Hx Cardiac Surgery - cardioMEMS implanted, Hx Cholecystectomy, Hx Tonsillectomy





- Immunizations


Hx Diphtheria, Pertussis, Tetanus Vaccination: Yes


Hx Pneumococcal Vaccination: 01/01/13





<WILIAN ESCALANTE - Last Filed: 07/17/18 14:54>





Review of Systems





- Review of Systems


Musculoskeletal: See HPI, Back pain - radiating around to left flank





<WILIAN ESCALANTE - Last Filed: 07/17/18 14:54>





Physical Exam





<SILVIO CHAVIRA - Last Filed: 07/17/18 14:07>





<WILIAN ESCALANTE - Last Filed: 07/17/18 14:54>





- Vital signs


Vitals: 


 











Temp Pulse Resp BP Pulse Ox


 


 98.8 F   78   20   149/87 H  94 


 


 07/17/18 10:42  07/17/18 10:42  07/17/18 10:42  07/17/18 10:42  07/17/18 10:42














- Notes


Notes: 





Physical Exam:


 


General: Alert, appears well. 


 


HEENT: Normocephalic. Atraumatic. PERRL. Extraocular movements intact. 

Oropharynx clear.


 


Neck: Supple. Non-tender.


 


Respiratory: No respiratory distress. Clear and equal breath sounds bilaterally.


 


Cardiovascular: Regular rate and rhythm. 


 


Abdominal: Obese. Non-tender. No distension. Normal Bowel Sounds. 


 


Back: Lower lumbar musculature tenderness with palpation. Left flank and 

lateral abdominal wall musculature tenderness with palpation. No deformity or 

step off.


 


Extremities: Moves all four extremities. Ambulates with walker.


Upper extremities: Normal inspection. Normal ROM.  


Lower extremities: 1+ edema to bilateral lower extremities. Normal ROM.


 


Neurological: Normal cognition. AAOx4. Normal speech.  


 


Psychological: Normal affect. Normal Mood. 


 


Skin: Warm. Dry. Normal color. (WILIAN ESCALANTE)





Course





- Laboratory


Result Diagrams: 


 07/17/18 11:26





 07/17/18 11:26





<SILVIO CHAVIRA - Last Filed: 07/17/18 14:07>





- Laboratory


Result Diagrams: 


 07/17/18 11:26





 07/17/18 11:26





<WILIAN ESCALANTE - Last Filed: 07/17/18 14:54>





- Re-evaluation


Re-evalutation: 





07/17/18 14:07


The patient's clinical history and review of his lab work and visit 9 days ago, 

suggests that he has chronic low back pain caused by degenerative joint disease 

in his lumbar spine and has probably strained some muscles in the left flank 

and abdomen recently.


Today his sed rate is 17, and his CRP is 5.5  He did have a CT limited of the 

abdomen and pelvis 9 days ago that was remarkable only for the degenerative 

joint disease and facet hypertrophy in the lumbar sacral spine.


He does not normally take any pain medication other than perhaps Tylenol.  I 

think it is reasonable to give him a low-dose narcotic for the next week or so 

until his lateral abdomen and flank muscle injury begins to improve.  He does 

have renal insufficiency, he does have diabetes, he would probably be better 

served not taking NSAIDs.





 (SILVIO CHAVIRA)





- Vital Signs


Vital signs: 


 











Temp Pulse Resp BP Pulse Ox


 


 98.8 F   78   20   149/87 H  94 


 


 07/17/18 10:42  07/17/18 10:42  07/17/18 10:42  07/17/18 10:42  07/17/18 10:42














- Laboratory


Laboratory results interpreted by me: 


 











  07/17/18 07/17/18 07/17/18





  11:26 11:26 11:26


 


RDW  14.1 H  


 


Sodium   145.8 H 


 


Creatinine   1.83 H 


 


Est GFR ( Amer)   45 L 


 


Est GFR (Non-Af Amer)   37 L 


 


Urine Protein    30 H


 


Urine Urobilinogen    2.0 H


 


Urine Ascorbic Acid    40 H














Discharge





<SILVIO CHAVIRA - Last Filed: 07/17/18 14:07>





<WILIAN ESCALANET - Last Filed: 07/17/18 14:54>





- Discharge


Clinical Impression: 


 Pain in abdominal muscle of left flank





Degenerative joint disease (DJD) of lumbar spine


Qualifiers:


 Spinal osteoarthritis complication: without myelopathy or radiculopathy 

Qualified Code(s): M47.816 - Spondylosis without myelopathy or radiculopathy, 

lumbar region





Abdominal muscle strain


Qualifiers:


 Encounter type: initial encounter Qualified Code(s): S39.011A - Strain of 

muscle, fascia and tendon of abdomen, initial encounter





Condition: Stable


Disposition: HOME, SELF-CARE


Additional Instructions: 


The pain you are having in your low back and left flank and abdomen is probably 

due to a muscle strain in the flank and abdomen and the chronic degenerative 

joint disease in your lumbar back.


You will be prescribed a pain medicine to take for the next several days until 

the muscle strain begins to improve.


You should follow-up with Dr. Arevalo later this week for recheck.





RETURN TO THE EMERGENCY ROOM IF ANY NEW OR WORSENING SYMPTOMS.








Prescriptions: 


Hydrocodone/Acetaminophen [Norco 5-325 mg Tablet] 1 tab PO ASDIR PRN #15 tablet


 PRN Reason: 


Referrals: 


NIKI AREVALO MD [NO LOCAL MD] - Follow up in 3-5 days


Scribe Attestation: 





07/17/18 14:13


I personally performed the services described in the documentation, reviewed 

and edited the documentation which was dictated to the scribe in my presence, 

and it accurately records my words and actions. (SILVIO CHAVIRA)





Scribe Documentation





- Scribe


Written by Scribe:: Jerry Suh, 7/17/2018 1454


acting as scribe for :: No





<WILIAN ESCALANTE - Last Filed: 07/17/18 14:54>

## 2018-07-17 NOTE — ER DOCUMENT REPORT
ED Medical Screen (RME)





- General


Chief Complaint: Flank Pain


Stated Complaint: WEAKNESS


Time Seen by Provider: 07/17/18 10:52


Notes: 





RAPID MEDICAL EVALUATION DISCLOSURE


I have seen this patient as part of a Rapid Medical Evaluation and, if 

applicable, placed any initially appropriate orders. The patient will be seen 

and fully evaluated, including a full history and physical exam, by a provider (

in Main ED or Fast Track) when a room becomes available.





------------------------------------------------------------------





67-year-old male here with complaints of right flank and right upper/lower 

quadrant pain ongoing for the past few days.  He was seen here for the same 

thing and CT scan did not show any acute emergency findings.  He was diagnosed 

with back strain and constipation and discharged home with prescription 

baclofen and ibuprofen.  He is back here today because his symptoms persist.  

He also has some lightheadedness that has been ongoing for the past few days.  

Lightheadedness is worse with positional changes.  He has tried drinking some 

fluids which has helped some with his symptoms.  He has not had any actual 

syncope.





EXAM


There is no right back muscle TTP


There is no flank muscle TTP


No CVA TTP


No abdominal TTP








TRAVEL OUTSIDE OF THE [place] IN LAST 30 DAYS: No





- Related Data


Allergies/Adverse Reactions: 


 





No Known Allergies Allergy (Verified 10/14/17 00:15)


 











Past Medical History





- Past Medical History


Cardiac Medical History: Reports: Hx Congestive Heart Failure, Hx Heart Attack, 

Hx Hypercholesterolemia, Hx Hypertension


Pulmonary Medical History: Reports: Hx COPD


Endocrine Medical History: Reports: Hx Diabetes Mellitus Type 2 - IDDM


Renal/ Medical History: Reports: Hx Kidney Stones, Hx Renal Insufficiency.  

Denies: Hx Peritoneal Dialysis


GI Medical History: Reports: Hx Gastroesophageal Reflux Disease


Psychiatric Medical History: Reports: Hx Depression


Past Surgical History: Reports: Hx Abdominal Surgery - Nissen fundoplication, 

Hx Cardiac Surgery - cardioMEMS implanted, Hx Cholecystectomy, Hx Tonsillectomy





- Immunizations


Hx Diphtheria, Pertussis, Tetanus Vaccination: Yes





Physical Exam





- Vital signs


Vitals: 





 











Temp Pulse Resp BP Pulse Ox


 


 98.8 F   78   20   149/87 H  94 


 


 07/17/18 10:42  07/17/18 10:42  07/17/18 10:42  07/17/18 10:42  07/17/18 10:42














Course





- Vital Signs


Vital signs: 





 











Temp Pulse Resp BP Pulse Ox


 


 98.8 F   78   20   149/87 H  94 


 


 07/17/18 10:42  07/17/18 10:42  07/17/18 10:42  07/17/18 10:42  07/17/18 10:42

## 2018-08-21 ENCOUNTER — HOSPITAL ENCOUNTER (EMERGENCY)
Dept: HOSPITAL 62 - ER | Age: 68
Discharge: HOME | End: 2018-08-21
Payer: OTHER GOVERNMENT

## 2018-08-21 VITALS — SYSTOLIC BLOOD PRESSURE: 127 MMHG | DIASTOLIC BLOOD PRESSURE: 77 MMHG

## 2018-08-21 DIAGNOSIS — E11.9: ICD-10-CM

## 2018-08-21 DIAGNOSIS — Z79.4: ICD-10-CM

## 2018-08-21 DIAGNOSIS — R10.11: Primary | ICD-10-CM

## 2018-08-21 DIAGNOSIS — I10: ICD-10-CM

## 2018-08-21 DIAGNOSIS — R10.12: ICD-10-CM

## 2018-08-21 DIAGNOSIS — J44.9: ICD-10-CM

## 2018-08-21 LAB
ADD MANUAL DIFF: NO
ALBUMIN SERPL-MCNC: 3.8 G/DL (ref 3.5–5)
ALP SERPL-CCNC: 71 U/L (ref 38–126)
ALT SERPL-CCNC: 37 U/L (ref 21–72)
ANION GAP SERPL CALC-SCNC: 13 MMOL/L (ref 5–19)
APPEARANCE UR: CLEAR
APTT PPP: YELLOW S
AST SERPL-CCNC: 36 U/L (ref 17–59)
BASOPHILS # BLD AUTO: 0 10^3/UL (ref 0–0.2)
BASOPHILS NFR BLD AUTO: 0.6 % (ref 0–2)
BILIRUB DIRECT SERPL-MCNC: 0.2 MG/DL (ref 0–0.4)
BILIRUB SERPL-MCNC: 0.3 MG/DL (ref 0.2–1.3)
BILIRUB UR QL STRIP: NEGATIVE
BUN SERPL-MCNC: 19 MG/DL (ref 7–20)
CALCIUM: 9.2 MG/DL (ref 8.4–10.2)
CHLORIDE SERPL-SCNC: 104 MMOL/L (ref 98–107)
CO2 SERPL-SCNC: 27 MMOL/L (ref 22–30)
EOSINOPHIL # BLD AUTO: 0.2 10^3/UL (ref 0–0.6)
EOSINOPHIL NFR BLD AUTO: 4.3 % (ref 0–6)
ERYTHROCYTE [DISTWIDTH] IN BLOOD BY AUTOMATED COUNT: 14 % (ref 11.5–14)
GLUCOSE SERPL-MCNC: 122 MG/DL (ref 75–110)
GLUCOSE UR STRIP-MCNC: NEGATIVE MG/DL
HCT VFR BLD CALC: 42.4 % (ref 37.9–51)
HGB BLD-MCNC: 14 G/DL (ref 13.5–17)
KETONES UR STRIP-MCNC: NEGATIVE MG/DL
LIPASE SERPL-CCNC: 86.6 U/L (ref 23–300)
LYMPHOCYTES # BLD AUTO: 1.2 10^3/UL (ref 0.5–4.7)
LYMPHOCYTES NFR BLD AUTO: 27.2 % (ref 13–45)
MCH RBC QN AUTO: 31.4 PG (ref 27–33.4)
MCHC RBC AUTO-ENTMCNC: 33.1 G/DL (ref 32–36)
MCV RBC AUTO: 95 FL (ref 80–97)
MONOCYTES # BLD AUTO: 0.3 10^3/UL (ref 0.1–1.4)
MONOCYTES NFR BLD AUTO: 6.6 % (ref 3–13)
NEUTROPHILS # BLD AUTO: 2.7 10^3/UL (ref 1.7–8.2)
NEUTS SEG NFR BLD AUTO: 61.3 % (ref 42–78)
NITRITE UR QL STRIP: NEGATIVE
PH UR STRIP: 5 [PH] (ref 5–9)
PLATELET # BLD: 200 10^3/UL (ref 150–450)
POTASSIUM SERPL-SCNC: 4.4 MMOL/L (ref 3.6–5)
PROT SERPL-MCNC: 6.9 G/DL (ref 6.3–8.2)
PROT UR STRIP-MCNC: NEGATIVE MG/DL
RBC # BLD AUTO: 4.47 10^6/UL (ref 4.35–5.55)
SODIUM SERPL-SCNC: 143.6 MMOL/L (ref 137–145)
SP GR UR STRIP: 1.02
TOTAL CELLS COUNTED % (AUTO): 100 %
UROBILINOGEN UR-MCNC: NEGATIVE MG/DL (ref ?–2)
WBC # BLD AUTO: 4.4 10^3/UL (ref 4–10.5)

## 2018-08-21 PROCEDURE — 74177 CT ABD & PELVIS W/CONTRAST: CPT

## 2018-08-21 PROCEDURE — 80053 COMPREHEN METABOLIC PANEL: CPT

## 2018-08-21 PROCEDURE — 81001 URINALYSIS AUTO W/SCOPE: CPT

## 2018-08-21 PROCEDURE — 83690 ASSAY OF LIPASE: CPT

## 2018-08-21 PROCEDURE — 99284 EMERGENCY DEPT VISIT MOD MDM: CPT

## 2018-08-21 PROCEDURE — 85025 COMPLETE CBC W/AUTO DIFF WBC: CPT

## 2018-08-21 PROCEDURE — 36415 COLL VENOUS BLD VENIPUNCTURE: CPT

## 2018-08-21 NOTE — RADIOLOGY REPORT (SQ)
EXAM DESCRIPTION:  CT ABD/PELVIS WITH IV   ORAL



COMPLETED DATE/TIME:  8/21/2018 1:17 pm



REASON FOR STUDY:  Right side pain for several months.



COMPARISON:   7/8/2018.



TECHNIQUE:  CT scan of the abdomen and pelvis performed using helical scanning technique with dynamic
 intravenous contrast injection.  No oral contrast. Images reviewed with lung, soft tissue, and bone 
windows. Reconstructed coronal and sagittal MPR images reviewed. Delayed images for evaluation of the
 urinary system also acquired. All images stored on PACS.

All CT scanners at this facility use dose modulation, iterative reconstruction, and/or weight based d
osing when appropriate to reduce radiation dose to as low as reasonably achievable (ALARA).

[facility]: Dose Right  [facility]: CareDose    [facility]: Dose Right    [facility]: Teradose 4D    [facility]: Smart Technologies



CONTRAST TYPE AND DOSE:  contrast/concentration: Isovue 350.00 mg/ml; Total Contrast Delivered: 97.0 
ml; Total Saline Delivered: 62.0 ml



RENAL FUNCTION:  Creatinine 1.8.



RADIATION DOSE:  CT Rad equipment meets quality standard of care and radiation dose reduction techniq
ues were employed. CTDIvol: NaN - NaN mGy. DLP: 0 mGy-cm..



LIMITATIONS:  None.



FINDINGS:  LOWER CHEST: Cardiac enlargement.  Visualized lung bases are clear.

LIVER: Fatty parenchyma.  No mass or enlargement.  No duct dilatation evident.

SPLEEN: Normal size. No focal lesions.

PANCREAS: No masses. No significant calcifications. No adjacent inflammation or peripancreatic fluid 
collections. Pancreatic duct not dilated.

GALLBLADDER: Surgically absent.

ADRENAL GLANDS: Chronic right nodularity, nonprogressive.  Probable adenomas.

RIGHT KIDNEY AND URETER: No solid masses. No significant calcification. No hydronephrosis or hydroure
ter.

LEFT KIDNEY AND URETER: No solid masses. No significant calcification. No hydronephrosis or hydrouret
er.

AORTA AND VESSELS: No aneurysm. No dissection. Renal arteries, SMA, celiac without stenosis.

RETROPERITONEUM: No retroperitoneal adenopathy, hemorrhage or masses.

BOWEL AND PERITONEAL CAVITY: Moderate stool.  No dilated loops to suggest obstruction.  No abnormal w
all thickening or inflammatory process related to bowel.  No ascites or abnormal gas.

APPENDIX: Normal.

PELVIS: No mass.  No free fluid. Normal bladder.

ABDOMINAL WALL: No masses. No hernias.

BONES: No significant or acute findings.

OTHER: No other significant finding.



IMPRESSION:  1. No acute or suspicious abdominopelvic abnormality.



TECHNICAL DOCUMENTATION:  JOB ID:  [id number]

Quality ID # [id number]: Final reports with documentation of one or more dose reduction techniques (e.g., Au
tomated exposure control, adjustment of the mA and/or kV according to patient size, use of iterative 
reconstruction technique)

 2011 [employer]- All Rights Reserved



Reading location - IP/workstation name: [email or web address]

## 2018-08-21 NOTE — ER DOCUMENT REPORT
ED GI/





- General


Chief Complaint: Abdominal Pain


Stated Complaint: SIDE PAIN


Time Seen by Provider: 08/21/18 09:30


Notes: 





Patient says he has been having abdominal pains in the right waist area for 

about 3 months.  He says these pains are getting worse and are exacerbated by 

movement, and helped by Tylenol.  He has them at least every day and they come 

and go.  Denies any nausea or vomiting.  Denies any diarrhea.  Has been having 

more trouble with his bowel movements.  Last bowel movement was 2 days ago.  

Says the pain starts over on the right and sometimes goes over into the left 

upper quadrant.  No fevers.  No urinary tract symptoms.





PMH: Cholecystectomy and a Nissen fundoplication





History of IDDM, hypertension, CHF.


TRAVEL OUTSIDE OF THE [place] IN LAST 30 DAYS: No





- Related Data


Allergies/Adverse Reactions: 


 





No Known Allergies Allergy (Verified 10/14/17 00:15)


 











Past Medical History





- Social History


Smoking Status: Never Smoker


Frequency of alcohol use: None


Drug Abuse: None


Family History: Reviewed & Not Pertinent


Patient has suicidal ideation: No


Patient has homicidal ideation: No





- Past Medical History


Cardiac Medical History: Reports: Hx Congestive Heart Failure, Hx Heart Attack, 

Hx Hypercholesterolemia, Hx Hypertension


Pulmonary Medical History: Reports: Hx COPD


Endocrine Medical History: Reports: Hx Diabetes Mellitus Type 2 - IDDM


Renal/ Medical History: Reports: Hx Kidney Stones, Hx Renal Insufficiency


GI Medical History: Reports: Hx Gastroesophageal Reflux Disease


Psychiatric Medical History: Reports: Hx Depression


Past Surgical History: Reports: Hx Abdominal Surgery - Nissen fundoplication, 

Hx Cardiac Surgery - cardioMEMS implanted, Hx Cholecystectomy, Hx Tonsillectomy





- Immunizations


Hx Diphtheria, Pertussis, Tetanus Vaccination: Yes


Hx Pneumococcal Vaccination: 01/01/13





Review of Systems





- Review of Systems


Notes: 





REVIEW OF SYSTEMS:





CONSTITUTIONAL :  Denies fever.


  


EENT:   Denies eye, ear, nose or mouth or throat pain or other symptoms.





CARDIOVASCULAR:  Denies chest pain.





RESPIRATORY:  Denies cough, chest congestion, or shortness of breath.





GASTROINTESTINAL: See HPI.





GENITOURINARY:  Denies difficulty or painful urinating, urinary frequency, 

blood in urine.





MUSCULOSKELETAL:  Denies back or neck pain.  Denies joint pain or swelling.





SKIN:   Denies rash or skin lesions.





NEUROLOGICAL:  Denies LOC or altered mental status.  Denies headache.  Denies 

sensory loss or motor deficits.





ALL OTHER SYSTEMS REVIEWED AND NEGATIVE.





Physical Exam





- Vital signs


Vitals: 


 











Temp Pulse Resp BP Pulse Ox


 


 99.0 F   84   16   142/78 H  93 


 


 08/21/18 08:34  08/21/18 08:34  08/21/18 08:34  08/21/18 08:34  08/21/18 08:34











Interpretation: Normal


Notes: 





PHYSICAL EXAMINATION:





GENERAL: Well-appearing, in no acute distress.





HEAD: Atraumatic, normocephalic.





EYES: Pupils equal round and reactive to light, extraocular movements intact.





ENT: oropharynx clear without exudates.  Moist mucous membranes.





NECK: Normal range of motion, supple.





LUNGS: Breath sounds clear and equal bilaterally.





HEART: Regular rate and rhythm without murmurs.





ABDOMEN: Soft, only minimal tenderness to palpation of the right waist region.  

No masses felt.  No bruits heard.  No guarding or rebound. 





BACK:  No tenderness throughout entire back.





EXTREMITIES: Normal range of motion without pain.





NEUROLOGICAL:  Normal speech, normal gait.  Normal sensory, motor, and reflex 

exams.  Awake, alert, and oriented x3.  Cranial nerves normal.





PSYCH: Normal mood, normal affect.





SKIN: Warm, dry, no rashes.





Course





- Re-evaluation


Re-evalutation: 





08/21/18 14:08


Patient's workup is entirely normal.  Labs and normal CT scan.  He seems very 

appreciative of the workup and acceptance of the results.











- Vital Signs


Vital signs: 


 











Temp Pulse Resp BP Pulse Ox


 


 97.7 F   62   16   127/77 H  92 


 


 08/21/18 14:48  08/21/18 14:48  08/21/18 14:48  08/21/18 14:48  08/21/18 14:48














- Laboratory


Result Diagrams: 


 08/21/18 09:33





 08/21/18 09:33


Laboratory results interpreted by me: 


 











  08/21/18





  09:33


 


Creatinine  1.81 H


 


Est GFR ( Amer)  45 L


 


Est GFR (Non-Af Amer)  37 L


 


Glucose  122 H














Discharge





- Discharge


Clinical Impression: 


 Right sided abdominal pain





Condition: Stable


Disposition: HOME, SELF-CARE


Additional Instructions: 


ABDOMINAL PAIN:





     There are many causes of abdominal pain.  Pain can mean a serious problem 

requiring surgery (such as appendicitis). It can also be an innocent problem 

that goes away on its own (such as a viral infection).  Often, time must pass 

to determine the cause of pain.


     The physician does not feel that hospitalization is necessary, at present. 

Things may change within the next 24 hours. Call the doctor or come back for re-

examination if any problems occur, such as:


     (1) Pain that becomes more severe, steady, or becomes concentrated in one 

specific area.  Also, pain that is more severe with movement or coughing.  


     (2) Vomiting that persists or becomes more frequent.  


     (3) Blood in the vomitus, urine, or bowel movements.  Blood in the stool 

may have a tarry or black appearance.


     (4) Shaking chills or fever greater than 100 degrees F. 


     (5) The abdomen becomes more distended or swollen. 


     (6) Bowel movements cease. 


     (7) Failure to improve as expected.








NORMAL EXAM AND WORKUP:


     At this time, your examination and workup show no significant abnormality.

  No significant abnormal physical findings are noted.  All laboratory, EKG, 

and imaging (x-ray, CT scans, ultrasound) studies that were ordered show no 

significant abnormality.


     Although your examination and all studies that were ordered showed no 

significant abnormal finding, there are no examinations and no studies that are 

100% accurate.  There is always the possibility that some abnormality could 

exist and not be detected with physical examination or within the limits and 

capabilities of laboratory and other studies.


     You should return or follow up as you were instructed on your visit today 

for further evaluation if your symptoms do not resolve.








USE OF ACETAMINOPHEN (Tylenol):


     Acetaminophen may be taken for pain relief or fever control. It's much 

safer than aspirin, offering a wider range of "safe" dosages.  It is safe 

during pregnancy.  Some brand names are Tylenol, Panadol, Datril, Anacin 3, 

Tempra, and Liquiprin. Acetaminophen can be repeated every four hours.  The 

following are maximum recommended dosages:





WEIGHT         Dose             Drops                  Elixir        Chewable(

80mg)


(LBS.)                            drprs=droppers    tsp=teaspoon





>89 pounds or adults          650 mg to 900 mg





Acetaminophen can be repeated every four hours.  Maximum dose not to exceed 

4000 mg a day.





   These maximum recommended dosages are slightly higher than the dosages 

written on the product container, but these dosages are very safe and below the 

toxic dosage for acetaminophen.





Ibuprofen





     Ibuprofen is an excellent, safe drug for pain control.  In addition, it 

has potent antiinflammatory effects which are beneficial, especially in the 

treatment of injuries, arthritis, or tendonitis. It's best to take ibuprofen 

with food.  Persons with ulcer disease or allergy to aspirin should notify 

their physician of this before taking ibuprofen.


     Take the medication exactly as prescribed.  Don't take additional doses 

unless instructed to do so by your doctor.  If you develop wheezing, shortness 

of breath, hives, faintness, stomach pain, vomiting, or dark black stools, 

return for re-evaluation at once.








FOLLOW-UP CARE:


If you have been referred to a physician for follow-up care, call the physician

s office for an appointment as you were instructed or within the next two days.

  If you experience worsening or a significant change in your symptoms, notify 

the physician immediately or return to the Emergency Department at any time for 

re-evaluation.

## 2018-11-14 ENCOUNTER — HOSPITAL ENCOUNTER (OUTPATIENT)
Dept: HOSPITAL 62 - OD | Age: 68
End: 2018-11-14
Attending: INTERNAL MEDICINE
Payer: OTHER GOVERNMENT

## 2018-11-14 DIAGNOSIS — R80.9: ICD-10-CM

## 2018-11-14 DIAGNOSIS — N18.3: ICD-10-CM

## 2018-11-14 DIAGNOSIS — E11.9: ICD-10-CM

## 2018-11-14 DIAGNOSIS — I12.9: Primary | ICD-10-CM

## 2018-11-14 LAB
ANION GAP SERPL CALC-SCNC: 11 MMOL/L (ref 5–19)
APPEARANCE UR: CLEAR
APTT PPP: YELLOW S
BILIRUB UR QL STRIP: NEGATIVE
BUN SERPL-MCNC: 15 MG/DL (ref 7–20)
CALCIUM: 9.4 MG/DL (ref 8.4–10.2)
CHLORIDE SERPL-SCNC: 101 MMOL/L (ref 98–107)
CO2 SERPL-SCNC: 32 MMOL/L (ref 22–30)
CREAT UR-MCNC: 306.4 MG/DL (ref 22–328)
ERYTHROCYTE [DISTWIDTH] IN BLOOD BY AUTOMATED COUNT: 13.8 % (ref 11.5–14)
GLUCOSE SERPL-MCNC: 100 MG/DL (ref 75–110)
GLUCOSE UR STRIP-MCNC: NEGATIVE MG/DL
HCT VFR BLD CALC: 44.9 % (ref 37.9–51)
HGB BLD-MCNC: 14.9 G/DL (ref 13.5–17)
KETONES UR STRIP-MCNC: NEGATIVE MG/DL
MCH RBC QN AUTO: 31.8 PG (ref 27–33.4)
MCHC RBC AUTO-ENTMCNC: 33.3 G/DL (ref 32–36)
MCV RBC AUTO: 96 FL (ref 80–97)
NITRITE UR QL STRIP: NEGATIVE
PH UR STRIP: 5 [PH] (ref 5–9)
PLATELET # BLD: 203 10^3/UL (ref 150–450)
POTASSIUM SERPL-SCNC: 4 MMOL/L (ref 3.6–5)
PROT UR STRIP-MCNC: 100 MG/DL
PROT UR STRIP-MCNC: 27.2 MG/DL (ref ?–12)
RBC # BLD AUTO: 4.69 10^6/UL (ref 4.35–5.55)
SODIUM SERPL-SCNC: 143.7 MMOL/L (ref 137–145)
SP GR UR STRIP: 1.02
UR PRO/CREAT RATIO RESULT: 0.1 MG/MG (ref 0–0.2)
UROBILINOGEN UR-MCNC: NEGATIVE MG/DL (ref ?–2)
WBC # BLD AUTO: 5.3 10^3/UL (ref 4–10.5)

## 2018-11-14 PROCEDURE — 82570 ASSAY OF URINE CREATININE: CPT

## 2018-11-14 PROCEDURE — 85027 COMPLETE CBC AUTOMATED: CPT

## 2018-11-14 PROCEDURE — 80048 BASIC METABOLIC PNL TOTAL CA: CPT

## 2018-11-14 PROCEDURE — 84156 ASSAY OF PROTEIN URINE: CPT

## 2018-11-14 PROCEDURE — 81001 URINALYSIS AUTO W/SCOPE: CPT

## 2018-11-14 PROCEDURE — 36415 COLL VENOUS BLD VENIPUNCTURE: CPT

## 2018-11-21 ENCOUNTER — HOSPITAL ENCOUNTER (EMERGENCY)
Dept: HOSPITAL 62 - ER | Age: 68
Discharge: HOME | End: 2018-11-21
Payer: MEDICARE

## 2018-11-21 VITALS — SYSTOLIC BLOOD PRESSURE: 149 MMHG | DIASTOLIC BLOOD PRESSURE: 86 MMHG

## 2018-11-21 DIAGNOSIS — J44.9: Primary | ICD-10-CM

## 2018-11-21 DIAGNOSIS — Z87.891: ICD-10-CM

## 2018-11-21 DIAGNOSIS — R06.02: ICD-10-CM

## 2018-11-21 DIAGNOSIS — I25.2: ICD-10-CM

## 2018-11-21 DIAGNOSIS — I11.0: ICD-10-CM

## 2018-11-21 DIAGNOSIS — E11.9: ICD-10-CM

## 2018-11-21 DIAGNOSIS — Z79.4: ICD-10-CM

## 2018-11-21 DIAGNOSIS — I45.10: ICD-10-CM

## 2018-11-21 DIAGNOSIS — I50.9: ICD-10-CM

## 2018-11-21 DIAGNOSIS — Z95.818: ICD-10-CM

## 2018-11-21 LAB
ADD MANUAL DIFF: NO
ALBUMIN SERPL-MCNC: 3.7 G/DL (ref 3.5–5)
ALP SERPL-CCNC: 72 U/L (ref 38–126)
ALT SERPL-CCNC: 22 U/L (ref 21–72)
ANION GAP SERPL CALC-SCNC: 10 MMOL/L (ref 5–19)
APPEARANCE UR: CLEAR
APTT PPP: (no result) S
AST SERPL-CCNC: 27 U/L (ref 17–59)
BASOPHILS # BLD AUTO: 0.1 10^3/UL (ref 0–0.2)
BASOPHILS NFR BLD AUTO: 1 % (ref 0–2)
BILIRUB DIRECT SERPL-MCNC: 0.2 MG/DL (ref 0–0.4)
BILIRUB SERPL-MCNC: 0.4 MG/DL (ref 0.2–1.3)
BILIRUB UR QL STRIP: NEGATIVE
BUN SERPL-MCNC: 16 MG/DL (ref 7–20)
CALCIUM: 9 MG/DL (ref 8.4–10.2)
CHLORIDE SERPL-SCNC: 103 MMOL/L (ref 98–107)
CK MB SERPL-MCNC: 1.4 NG/ML (ref ?–4.55)
CK SERPL-CCNC: 134 U/L (ref 55–170)
CO2 SERPL-SCNC: 29 MMOL/L (ref 22–30)
EOSINOPHIL # BLD AUTO: 0.2 10^3/UL (ref 0–0.6)
EOSINOPHIL NFR BLD AUTO: 2.4 % (ref 0–6)
ERYTHROCYTE [DISTWIDTH] IN BLOOD BY AUTOMATED COUNT: 13.9 % (ref 11.5–14)
GLUCOSE SERPL-MCNC: 84 MG/DL (ref 75–110)
GLUCOSE UR STRIP-MCNC: NEGATIVE MG/DL
HCT VFR BLD CALC: 42.5 % (ref 37.9–51)
HGB BLD-MCNC: 14.2 G/DL (ref 13.5–17)
KETONES UR STRIP-MCNC: NEGATIVE MG/DL
LYMPHOCYTES # BLD AUTO: 1.6 10^3/UL (ref 0.5–4.7)
LYMPHOCYTES NFR BLD AUTO: 18.5 % (ref 13–45)
MCH RBC QN AUTO: 31.6 PG (ref 27–33.4)
MCHC RBC AUTO-ENTMCNC: 33.4 G/DL (ref 32–36)
MCV RBC AUTO: 95 FL (ref 80–97)
MONOCYTES # BLD AUTO: 0.5 10^3/UL (ref 0.1–1.4)
MONOCYTES NFR BLD AUTO: 6 % (ref 3–13)
NEUTROPHILS # BLD AUTO: 6.3 10^3/UL (ref 1.7–8.2)
NEUTS SEG NFR BLD AUTO: 72.1 % (ref 42–78)
NITRITE UR QL STRIP: NEGATIVE
PH UR STRIP: 8 [PH] (ref 5–9)
PLATELET # BLD: 208 10^3/UL (ref 150–450)
POTASSIUM SERPL-SCNC: 4.1 MMOL/L (ref 3.6–5)
PROT SERPL-MCNC: 6.7 G/DL (ref 6.3–8.2)
PROT UR STRIP-MCNC: 30 MG/DL
RBC # BLD AUTO: 4.49 10^6/UL (ref 4.35–5.55)
SODIUM SERPL-SCNC: 142 MMOL/L (ref 137–145)
SP GR UR STRIP: 1.01
TOTAL CELLS COUNTED % (AUTO): 100 %
TROPONIN I SERPL-MCNC: < 0.012 NG/ML
UROBILINOGEN UR-MCNC: NEGATIVE MG/DL (ref ?–2)
WBC # BLD AUTO: 8.7 10^3/UL (ref 4–10.5)

## 2018-11-21 PROCEDURE — 93010 ELECTROCARDIOGRAM REPORT: CPT

## 2018-11-21 PROCEDURE — 96374 THER/PROPH/DIAG INJ IV PUSH: CPT

## 2018-11-21 PROCEDURE — 85025 COMPLETE CBC W/AUTO DIFF WBC: CPT

## 2018-11-21 PROCEDURE — 93005 ELECTROCARDIOGRAM TRACING: CPT

## 2018-11-21 PROCEDURE — 36415 COLL VENOUS BLD VENIPUNCTURE: CPT

## 2018-11-21 PROCEDURE — 82550 ASSAY OF CK (CPK): CPT

## 2018-11-21 PROCEDURE — 71045 X-RAY EXAM CHEST 1 VIEW: CPT

## 2018-11-21 PROCEDURE — 81001 URINALYSIS AUTO W/SCOPE: CPT

## 2018-11-21 PROCEDURE — 94640 AIRWAY INHALATION TREATMENT: CPT

## 2018-11-21 PROCEDURE — 82553 CREATINE MB FRACTION: CPT

## 2018-11-21 PROCEDURE — 80053 COMPREHEN METABOLIC PANEL: CPT

## 2018-11-21 PROCEDURE — 84484 ASSAY OF TROPONIN QUANT: CPT

## 2018-11-21 PROCEDURE — 83880 ASSAY OF NATRIURETIC PEPTIDE: CPT

## 2018-11-21 PROCEDURE — 99285 EMERGENCY DEPT VISIT HI MDM: CPT

## 2018-11-21 NOTE — ER DOCUMENT REPORT
ED Respiratory Problem





- General


Chief Complaint: Shortness Of Breath


Stated Complaint: SHORTNESS OF BREATH


Time Seen by Provider: 11/21/18 16:01


Notes: 





This is a 68-year-old male to the emergency department for evaluation of 

shortness of breath.  Patient has a long-standing history of congestive heart 

failure.  Has some sort of implantable device which monitors his CHF.  Denies 

any chest pain.  States that talking makes his breathing worse and feels short 

of breath when he lies flat and when he talks and moves.


TRAVEL OUTSIDE OF THE [place] IN LAST 30 DAYS: No





- HPI


Patient complains to provider of: Short of breath


Onset: Yesterday


Duration: Continuous


Quality of pain: No pain





- Related Data


Allergies/Adverse Reactions: 


 





No Known Allergies Allergy (Verified 10/14/17 00:15)


 











Past Medical History





- General


Information source: Patient





- Social History


Smoking Status: Former Smoker


Chew tobacco use (# tins/day): No


Frequency of alcohol use: None


Drug Abuse: None


Lives with: Family


Family History: Reviewed & Not Pertinent


Patient has suicidal ideation: No


Patient has homicidal ideation: No





- Past Medical History


Cardiac Medical History: Reports: Hx Congestive Heart Failure, Hx Heart Attack, 

Hx Hypercholesterolemia, Hx Hypertension


Pulmonary Medical History: Reports: Hx COPD


Endocrine Medical History: Reports: Hx Diabetes Mellitus Type 2 - IDDM


Renal/ Medical History: Reports: Hx Kidney Stones, Hx Renal Insufficiency.  

Denies: Hx Peritoneal Dialysis


GI Medical History: Reports: Hx Gastroesophageal Reflux Disease


Psychiatric Medical History: Reports: Hx Depression


Past Surgical History: Reports: Hx Abdominal Surgery - Nissen fundoplication, 

Hx Cardiac Surgery - cardioMEMS implanted, Hx Cholecystectomy, Hx Tonsillectomy





- Immunizations


Hx Diphtheria, Pertussis, Tetanus Vaccination: Yes


Hx Pneumococcal Vaccination: 01/01/13





Review of Systems





- Review of Systems


Notes: 





Constitutional: denies: Chills, Diaphoresis, Fever, Malaise, Weakness





EENT: denies: Eye discharge, Blurred vision, Tearing, Double vision, Nose 

congestion, Nose discharge, Throat swelling, Mouth pain





Cardiovascular: denies: Palpitations, Heart racing, Orthopnea, Dyspnea, Chest 

pain





Respiratory: denies: Cough, Hurts to breathe, Wheezing,.  Does complain of 

shortness of breath





Gastrointestinal: denies: Abdominal pain, Diarrhea, Nausea, Vomiting, Black 

stools, bright red blood in stool





Genitourinary: denies: Burning, Dysuria, Discharge, Frequency, Flank pain, 

Hematuria





Musculoskeletal:  denies: Joint pain, Joint swelling, Muscle pain, Muscle 

stiffness, back pain





Hematologic/Lymphatic:  denies: Anemia, Easy bleeding, Easy bruising, Blood 

clots





Neurological/Psychological: denies: Confusion, Dementia, Depression, Loss of 

consciousness





Skin: No lesions, no masses, no skin breakdown, no abscesses





Physical Exam





- Vital signs


Vitals: 


 











Pulse Ox


 


 95 


 


 11/21/18 15:40











Interpretation: Normal





- General


General appearance: Appears well, Alert





- HEENT


Head: Normocephalic, Atraumatic


Eyes: Normal


Pupils: PERRL





- Respiratory


Respiratory status: No respiratory distress


Chest status: Nontender


Breath sounds: Normal


Chest palpation: Normal





- Cardiovascular


Rhythm: Regular


Heart sounds: Normal auscultation


Murmur: No





- Abdominal


Inspection: Normal


Distension: No distension


Bowel sounds: Normal


Tenderness: Nontender


Organomegaly: No organomegaly





- Back


Back: Normal, Nontender





- Extremities


General upper extremity: Normal inspection, Nontender, Normal color, Normal ROM

, Normal temperature


General lower extremity: Normal inspection, Nontender, Normal color, Normal ROM

, Normal temperature, Normal weight bearing.  No: Karo's sign





- Neurological


Neuro grossly intact: Yes


Cognition: Normal


Orientation: AAOx4


[place] Coma Scale Eye Opening: Spontaneous


[place] Coma Scale Verbal: Oriented


[place] Coma Scale Motor: Obeys Commands


[place] Coma Scale Total: 15


Speech: Normal


Motor strength normal: LUE, RUE, LLE, RLE


Sensory: Normal





- Psychological


Associated symptoms: Normal affect, Normal mood





- Skin


Skin Temperature: Warm


Skin Moisture: Dry


Skin Color: Normal





Course





- Re-evaluation


Re-evalutation: 





11/21/18 19:07


At this time patient's BNP is within normal limits.  He has some cardiomegaly 

but no signs of CHF at this time.  Oxygen saturations have never fallen below 92

%.  Patient is talking in full sentences.  We had a lifelong conversation.  

This is a pleasant individual with a very great fund of knowledge of his 

medical conditions.  Followed by cardiology.  Has regular primary care doctor.  

Has a plan of action in the event that his symptoms get worse.  At this time I 

would like to give him a breathing treatment and some Lasix and let him go 

home.  He is comfortable with that plan.  Anticipate discharge shortly.





- Vital Signs


Vital signs: 


 











Temp Pulse Resp BP Pulse Ox


 


       23 H  149/90 H  95 


 


       11/21/18 16:01  11/21/18 16:01  11/21/18 16:01














- Laboratory


Result Diagrams: 


 11/21/18 16:00





 11/21/18 16:45


Laboratory results interpreted by me: 


 











  11/21/18 11/21/18





  16:45 18:02


 


Creatinine  1.56 H 


 


Est GFR ( Amer)  54 L 


 


Est GFR (Non-Af Amer)  44 L 


 


Urine Protein   30 H














Discharge





- Discharge


Clinical Impression: 


Dyspnea


Qualifiers:


 Dyspnea type: unspecified Qualified Code(s): R06.00 - Dyspnea, unspecified





Condition: Good


Disposition: HOME, SELF-CARE


Instructions:  Dyspnea, Nonspecific ([facility])


Additional Instructions: 


You were given a breathing treatment and some Lasix.  Your labs looked 

unremarkable.  Your chest x-ray did not show any signs of fluid on your lungs.  

At this time we fell like it is okay for you to go home.  If you begin to get 

worse please return and let me check you again tomorrow.  I will be here 

tomorrow as well as on Friday from the hours of 2 PM until midnight so please 

return so that I can recheck you if things are getting worse.  It was a 

pleasure taking care of you today.


Referrals: 


LYNNE AQUINO MD [ACTIVE STAFF] - Follow up as needed

## 2018-11-21 NOTE — RADIOLOGY REPORT (SQ)
EXAM DESCRIPTION:  CHEST SINGLE VIEW



COMPLETED DATE/TIME:  11/21/2018 4:06 pm



REASON FOR STUDY:  bed 2 db



COMPARISON:  7/6/2017



EXAM PARAMETERS:  NUMBER OF VIEWS: One view.

TECHNIQUE: Single frontal radiographic view of the chest acquired.

RADIATION DOSE: NA

LIMITATIONS: None.



FINDINGS:  LUNGS AND PLEURA: Low lung volumes.  No infiltrate, effusion, or mass.

MEDIASTINUM AND HILAR STRUCTURES: No masses.  Contour normal.

HEART AND VASCULAR STRUCTURES: Heart size is borderline.  No pulmonary edema.

BONES: No acute findings.

HARDWARE: None in the chest.

OTHER: No other significant finding.



IMPRESSION:  Borderline cardiomegaly with no pulmonary edema.



TECHNICAL DOCUMENTATION:  JOB ID:  [id number]

 2011 [employer]- All Rights Reserved



Reading location - IP/workstation name: [email or web address]

## 2018-11-22 NOTE — EKG REPORT
SEVERITY:- ABNORMAL ECG -

SINUS RHYTHM

PROBABLE LEFT ATRIAL ABNORMALITY

IVCD, CONSIDER ATYPICAL RBBB

PROBABLE INFERIOR INFARCT, OLD

:

Confirmed by: Tanisha Corley MD 22-Nov-2018 09:37:40
Bladder non-tender and non-distended. Patient had 4wet diapers today.

## 2019-03-05 ENCOUNTER — HOSPITAL ENCOUNTER (OUTPATIENT)
Dept: HOSPITAL 62 - ER | Age: 69
Setting detail: OBSERVATION
LOS: 1 days | Discharge: ANOTHER HOSPITAL | End: 2019-03-06
Attending: INTERNAL MEDICINE | Admitting: INTERNAL MEDICINE
Payer: MEDICARE

## 2019-03-05 DIAGNOSIS — I50.9: ICD-10-CM

## 2019-03-05 DIAGNOSIS — N18.3: ICD-10-CM

## 2019-03-05 DIAGNOSIS — R20.0: ICD-10-CM

## 2019-03-05 DIAGNOSIS — R06.02: ICD-10-CM

## 2019-03-05 DIAGNOSIS — E11.59: ICD-10-CM

## 2019-03-05 DIAGNOSIS — E11.22: ICD-10-CM

## 2019-03-05 DIAGNOSIS — R00.8: ICD-10-CM

## 2019-03-05 DIAGNOSIS — Z82.49: ICD-10-CM

## 2019-03-05 DIAGNOSIS — Z79.02: ICD-10-CM

## 2019-03-05 DIAGNOSIS — R53.1: ICD-10-CM

## 2019-03-05 DIAGNOSIS — I13.0: ICD-10-CM

## 2019-03-05 DIAGNOSIS — Z79.82: ICD-10-CM

## 2019-03-05 DIAGNOSIS — Z90.49: ICD-10-CM

## 2019-03-05 DIAGNOSIS — Z79.899: ICD-10-CM

## 2019-03-05 DIAGNOSIS — Z79.4: ICD-10-CM

## 2019-03-05 DIAGNOSIS — R63.4: ICD-10-CM

## 2019-03-05 DIAGNOSIS — Z86.73: ICD-10-CM

## 2019-03-05 DIAGNOSIS — N17.9: ICD-10-CM

## 2019-03-05 DIAGNOSIS — E78.2: ICD-10-CM

## 2019-03-05 DIAGNOSIS — R00.1: Primary | ICD-10-CM

## 2019-03-05 DIAGNOSIS — I25.2: ICD-10-CM

## 2019-03-05 DIAGNOSIS — R29.6: ICD-10-CM

## 2019-03-05 DIAGNOSIS — Z98.890: ICD-10-CM

## 2019-03-05 LAB
ADD MANUAL DIFF: NO
ANION GAP SERPL CALC-SCNC: 9 MMOL/L (ref 5–19)
BASOPHILS # BLD AUTO: 0 10^3/UL (ref 0–0.2)
BASOPHILS NFR BLD AUTO: 0.6 % (ref 0–2)
BUN SERPL-MCNC: 17 MG/DL (ref 7–20)
CALCIUM: 9.1 MG/DL (ref 8.4–10.2)
CHLORIDE SERPL-SCNC: 105 MMOL/L (ref 98–107)
CK SERPL-CCNC: 116 U/L (ref 55–170)
CO2 SERPL-SCNC: 29 MMOL/L (ref 22–30)
EOSINOPHIL # BLD AUTO: 0.2 10^3/UL (ref 0–0.6)
EOSINOPHIL NFR BLD AUTO: 3.7 % (ref 0–6)
ERYTHROCYTE [DISTWIDTH] IN BLOOD BY AUTOMATED COUNT: 13.6 % (ref 11.5–14)
GLUCOSE SERPL-MCNC: 125 MG/DL (ref 75–110)
HCT VFR BLD CALC: 41.5 % (ref 37.9–51)
HGB BLD-MCNC: 14.2 G/DL (ref 13.5–17)
INR PPP: 0.96
LYMPHOCYTES # BLD AUTO: 1.6 10^3/UL (ref 0.5–4.7)
LYMPHOCYTES NFR BLD AUTO: 28.6 % (ref 13–45)
MCH RBC QN AUTO: 32.5 PG (ref 27–33.4)
MCHC RBC AUTO-ENTMCNC: 34.3 G/DL (ref 32–36)
MCV RBC AUTO: 95 FL (ref 80–97)
MONOCYTES # BLD AUTO: 0.3 10^3/UL (ref 0.1–1.4)
MONOCYTES NFR BLD AUTO: 5.3 % (ref 3–13)
NEUTROPHILS # BLD AUTO: 3.3 10^3/UL (ref 1.7–8.2)
NEUTS SEG NFR BLD AUTO: 61.8 % (ref 42–78)
PLATELET # BLD: 173 10^3/UL (ref 150–450)
POTASSIUM SERPL-SCNC: 4.4 MMOL/L (ref 3.6–5)
PROTHROMBIN TIME: 13.3 SEC (ref 11.4–15.4)
RBC # BLD AUTO: 4.39 10^6/UL (ref 4.35–5.55)
SODIUM SERPL-SCNC: 142.6 MMOL/L (ref 137–145)
TOTAL CELLS COUNTED % (AUTO): 100 %
WBC # BLD AUTO: 5.4 10^3/UL (ref 4–10.5)

## 2019-03-05 PROCEDURE — 81001 URINALYSIS AUTO W/SCOPE: CPT

## 2019-03-05 PROCEDURE — 87040 BLOOD CULTURE FOR BACTERIA: CPT

## 2019-03-05 PROCEDURE — 85610 PROTHROMBIN TIME: CPT

## 2019-03-05 PROCEDURE — 70450 CT HEAD/BRAIN W/O DYE: CPT

## 2019-03-05 PROCEDURE — 84484 ASSAY OF TROPONIN QUANT: CPT

## 2019-03-05 PROCEDURE — 93005 ELECTROCARDIOGRAM TRACING: CPT

## 2019-03-05 PROCEDURE — 85025 COMPLETE CBC W/AUTO DIFF WBC: CPT

## 2019-03-05 PROCEDURE — G0378 HOSPITAL OBSERVATION PER HR: HCPCS

## 2019-03-05 PROCEDURE — 82550 ASSAY OF CK (CPK): CPT

## 2019-03-05 PROCEDURE — 80048 BASIC METABOLIC PNL TOTAL CA: CPT

## 2019-03-05 PROCEDURE — 80053 COMPREHEN METABOLIC PANEL: CPT

## 2019-03-05 PROCEDURE — 99285 EMERGENCY DEPT VISIT HI MDM: CPT

## 2019-03-05 PROCEDURE — 80061 LIPID PANEL: CPT

## 2019-03-05 PROCEDURE — 82962 GLUCOSE BLOOD TEST: CPT

## 2019-03-05 PROCEDURE — 83036 HEMOGLOBIN GLYCOSYLATED A1C: CPT

## 2019-03-05 PROCEDURE — 97116 GAIT TRAINING THERAPY: CPT

## 2019-03-05 PROCEDURE — 93010 ELECTROCARDIOGRAM REPORT: CPT

## 2019-03-05 PROCEDURE — 97110 THERAPEUTIC EXERCISES: CPT

## 2019-03-05 PROCEDURE — 36415 COLL VENOUS BLD VENIPUNCTURE: CPT

## 2019-03-05 PROCEDURE — 71045 X-RAY EXAM CHEST 1 VIEW: CPT

## 2019-03-05 PROCEDURE — 97163 PT EVAL HIGH COMPLEX 45 MIN: CPT

## 2019-03-05 PROCEDURE — 93306 TTE W/DOPPLER COMPLETE: CPT

## 2019-03-05 RX ADMIN — INSULIN HUMAN SCH: 100 INJECTION, SOLUTION PARENTERAL at 21:44

## 2019-03-05 RX ADMIN — INSULIN HUMAN SCH: 100 INJECTION, SOLUTION PARENTERAL at 17:44

## 2019-03-05 RX ADMIN — Medication SCH ML: at 21:32

## 2019-03-05 RX ADMIN — CLOPIDOGREL BISULFATE SCH MG: 75 TABLET, FILM COATED ORAL at 18:31

## 2019-03-05 NOTE — EKG REPORT
SEVERITY:- ABNORMAL ECG -

SINUS RHYTHM

VENTRICULAR TRIGEMINY

FIRST DEGREE AV BLOCK

NONSPECIFIC IVCD WITH LAD

BORDERLINE R WAVE PROGRESSION, ANTERIOR LEADS

:

Confirmed by: Tanisha Corley MD 05-Mar-2019 10:25:46

## 2019-03-05 NOTE — ER DOCUMENT REPORT
ED General





- General


Chief Complaint: General Weakness


Stated Complaint: DIFFICULTY BREATHING


Time Seen by Provider: 03/05/19 09:40


Primary Care Provider: 


NIKI GRANT MD [Primary Care Provider] - Follow up as needed


Notes: 





68-year-old male presents to the ER complaining of difficulty breathing and 

right-sided weakness.  The patient stated that he went to get out of bed this 

morning and his right arm and leg felt very weak and heavy.  He also is short of

breath which is new for him.  He stated his symptoms last for about an hour and 

a half but now have resolved.  He has no history of stroke.  He had no chest 

pain.  He was short of breath.  Patient denies any nausea vomiting.  Patient 

denied any numbness to his right side he just felt very weak and heavy.  He 

denies any abdominal pain.  Denies calf pain leg swelling.  Stated he went to 

bed just fine.  He did get up to go to the bathroom and did not notice any 

deficits and awoke with deficits he cannot provide any time throughout the night

when he got up.


TRAVEL OUTSIDE OF THE [place] IN LAST 30 DAYS: No





- Related Data


Allergies/Adverse Reactions: 


                                        





No Known Allergies Allergy (Verified 10/14/17 00:15)


   











Past Medical History





- Social History


Smoking Status: Unknown if Ever Smoked


Family History: Reviewed & Not Pertinent





- Past Medical History


Cardiac Medical History: Reports: Hx Congestive Heart Failure, Hx Heart Attack, 

Hx Hypercholesterolemia, Hx Hypertension


Pulmonary Medical History: Reports: Hx COPD


Endocrine Medical History: Reports: Hx Diabetes Mellitus Type 2 - IDDM


Renal/ Medical History: Reports: Hx Kidney Stones, Hx Renal Insufficiency.  

Denies: Hx Peritoneal Dialysis


GI Medical History: Reports: Hx Gastroesophageal Reflux Disease


Psychiatric Medical History: Reports: Hx Depression


Past Surgical History: Reports: Hx Abdominal Surgery - Nissen fundoplication, Hx

Cardiac Surgery - cardioMEMS implanted, Hx Cholecystectomy, Hx Tonsillectomy





- Immunizations


Hx Diphtheria, Pertussis, Tetanus Vaccination: Yes


Hx Pneumococcal Vaccination: 01/01/13





Review of Systems





- Review of Systems


Constitutional: denies: Chills, Fever


Cardiovascular: Dyspnea, Edema.  denies: Chest pain


Gastrointestinal: denies: Nausea, Vomiting


Neurological/Psychological: Weakness, Loss of power.  denies: Headaches, Speech 

impairment, Numbness


-: Yes All other systems reviewed and negative





Physical Exam





- Vital signs


Vitals: 


                                        











Resp Pulse Ox


 


 6 L  95 


 


 03/05/19 09:57  03/05/19 09:57














- Notes


Notes: 





GENERAL_APPEARANCE: well_nourished, alert, cooperative


 VITALS: reviewed, see vital signs table.


 HEAD: no_swelling\tenderness on the head.


 EYES: PERRL, EOMI, conjunctiva_clear.


 NOSE: no_nasal_discharge.


 MOUTH: (-)decreased moisture.


 THROAT: no_tonsilar_inflammation, no_airway_obstruction. no_lymphadenopathy


 NECK: supple, no_neck_tenderness, (-)thyromegaly.


 BACK: no_back_tenderness.


 CHEST_WALL: no_chest_tenderness.


 LUNGS: no_wheezing, no_rales, no_rhonchi, (-)accessory muscle use, good air 

exchange bilateral.


 HEART: normal_rate, normal_rhythm, normal_S1, normal_S2, (-)S3, (-)S4, 

no_murmur, no_rub.


 ABDOMEN: normal_BS, soft, no_abd_tenderness, (-)guarding, (-)rebound, 

no_organomegaly, no_abd_masses.


 EXTREMITIES:  good pulses in all_extremities, no_swelling\tenderness in the 

extremities, no_edema.


 SKIN: warm, dry, good_color, no_rash.


 MENTAL_STATUS: speech_clear, oriented_X_3, normal_affect, 

responds_appropriately to questions.


 NEURO: Neg Motor or Sensory Deficits on exam, CN 2-12 intact, DTR 2+ symmetric 

x 4, No cerbellar signs - NIH=0

















Course





- Re-evaluation


Re-evalutation: 





03/05/19 10:06


The patient is come in for right-sided weakness that lasted for about an hour 

and a half all.  Patient had an episode of bradycardia also on the ER he is 

having frequent PVCs.  At times bigeminy.  Watching him closely.





03/05/19 13:23





The patient had a bradycardic episode while he was here.  However that quickly 

resolved and patient was asymptomatic through it.





Patient stroke symptoms and right-sided heaviness has stopped only lasted for an

hour and a half and has not recurred.  Patient was given a full strength aspirin

here a CT to see only showed chronic changes.





Everything else seems to be no significant abnormalities.  Due to the patient's 

age transient right-sided heaviness concern for possible TIA.  I think is 

appropriate to hospitalize the patient and have an MRI in the morning to rule 

out any kind of strokelike illness.  Scars of bradycardia and trigeminy the 

patient is asymptomatic at this time he does have occasional shortness of breath

but when the patient does bradycardia he does not go into any high-grade AV 

blocks.  He remained stable











- Vital Signs


Vital signs: 


                                        











Temp Pulse Resp BP Pulse Ox


 


 98 F   67   15   139/87 H  97 


 


 03/05/19 10:27  03/05/19 10:27  03/05/19 13:00  03/05/19 11:01  03/05/19 13:00














- Laboratory


Result Diagrams: 


                                 03/05/19 10:10





                                 03/05/19 10:10


Laboratory results interpreted by me: 


                                        











  03/05/19





  10:10


 


Creatinine  1.71 H


 


Est GFR ( Amer)  48 L


 


Est GFR (Non-Af Amer)  40 L


 


Glucose  125 H














- Diagnostic Test


Radiology reviewed: Reports reviewed


Radiology results interpreted by me: 





03/05/19 13:23





                                        





Chest X-Ray  03/05/19 09:52


IMPRESSION:  Cardiomegaly without pulmonary edema.


 








Head CT  03/05/19 09:52


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 

hemisphere


No acute findings


EVIDENCE OF ACUTE STROKE: NO.


 














- EKG Interpretation by Me


EKG shows normal: Sinus rhythm


Rate: Bradycardia


Rhythm: NSR


Additional EKG results interpreted by me: 





03/05/19 13:23


Patient has occasional trigeminy and unifocal PVCs.  No acute ST abnormality


03/05/19 13:23








Discharge





- Discharge


Clinical Impression: 


 Right sided weakness, Trigeminy





Dyspnea


Qualifiers:


 Dyspnea type: unspecified Qualified Code(s): R06.00 - Dyspnea, unspecified





Condition: Good


Disposition: ADMITTED AS OBSERVATION


Admitting Provider: Hospitalist


Unit Admitted: Telemetry


Referrals: 


NIKI GRANT MD [Primary Care Provider] - Follow up as needed

## 2019-03-05 NOTE — RADIOLOGY REPORT (SQ)
EXAM DESCRIPTION:  CT HEAD WITHOUT



COMPLETED DATE/TIME:  3/5/2019 11:50 am



REASON FOR STUDY:  Right sided weakness



COMPARISON:  None.



TECHNIQUE:  Axial images acquired through the brain without intravenous contrast.  Images reviewed wi
th bone, brain and subdural windows.  Additional sagittal and coronal reconstructions were generated.
 Images stored on PACS.

All CT scanners at this facility use dose modulation, iterative reconstruction, and/or weight based d
osing when appropriate to reduce radiation dose to as low as reasonably achievable (ALARA).

[facility]: Dose Right  [facility]: CareDose    [facility]: Dose Right    [facility]: Teradose 4D    [facility]: Smart Technologies



RADIATION DOSE:  CT Rad equipment meets quality standard of care and radiation dose reduction techniq
ues were employed. CTDIvol: 53.2 mGy. DLP: 1044 mGy-cm. mGy.



LIMITATIONS:  None.



FINDINGS:  VENTRICLES: Normal size and contour.

CEREBRUM: There is an old right occipital cortical and subcortical white matter infarct, best shown o
n sagittal reconstruction image 31, axial image 13, and coronal image 58 through 62. No CT evidence o
f acute ischemic change, acute intracranial hemorrhage, mass effect, or midline shift.

CEREBELLUM: Punctate lacunar infarct right cerebellar hemisphere axial image 7.  No CT evidence of ac
niles posterior fossa ischemic change, acute posterior fossa hemorrhage mass effect or midline shift.

EXTRAAXIAL SPACES: No fluid collections.  No masses.

ORBITS AND GLOBE: No intra- or extraconal masses.  Normal contour of globe without masses.

CALVARIUM: No fracture.

PARANASAL SINUSES: No fluid or mucosal thickening.

SOFT TISSUES: No mass or hematoma.

OTHER: No other significant finding.



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

No acute findings

EVIDENCE OF ACUTE STROKE: NO.



COMMENT:  Quality ID # [id number]: Final reports with documentation of one or more dose reduction techniques
 (e.g., Automated exposure control, adjustment of the mA and/or kV according to patient size, use of 
iterative reconstruction technique)



TECHNICAL DOCUMENTATION:  JOB ID:  [id number]

 2011 [employer]- All Rights Reserved



Reading location - IP[facility]workstation name: [email or web address]

## 2019-03-05 NOTE — PDOC H&P
History of Present Illness


Admission Date/PCP: 


  03/05/19 13:57





  NIKI GRANT MD





Patient complains of: numbness in his right side and dizziness


History of Present Illness: 


KENNY GUEVARA is a 68 year old male who presents to the ER with a complaint of 

numbness on his right side and dizziness. The dizziness has been intermittent 

for the past 2 years since his PCP and cardiologist adjusted some of his 

medications. He was actually placed on an event recorder in December and has not

returned it to his cardiologist yet. The dizziness was a little worse today 

though. The numbness was new today. it started in his toes and ended at his 

shoulders. nothing made it better or worse. he states it was almost gone when 

EMS arrived. That was about an hour after starting. he still has some numbness 

when seen in the ER. 





On CT in the ER he was found to have an old stroke. He was not aware of any 

previous CVA








Past Medical History


Cardiac Medical History: Reports: Congestive Heart Failure, Myocardial 

Infarction, Hyperlipidema, Hypertension


Pulmonary Medical History: Reports: Chronic Obstructive Pulmonary Disease (COPD)


Endocrine Medical History: Reports: Diabetes Mellitus Type 2 - IDDM


GI Medical History: Reports: Gastroesophageal Reflux Disease


Psychiatric Medical History: Reports: Depression





Past Surgical History


Past Surgical History: Reports: Cholecystectomy, Tonsillectomy





Social History


Smoking Status: Unknown if Ever Smoked


Frequency of Alcohol Use: None


Hx Recreational Drug Use: No


Hx Prescription Drug Abuse: No





- Advance Directive


Resuscitation Status: Full Code





Family History


Family History: DM, Hypertension


Parental Family History Reviewed: Yes


Children Family History Reviewed: Yes


Sibling(s) Family History Reviewed.: Yes





Medication/Allergy


Home Medications: 








Aspirin [Ecotrin 81 mg EC Tablet] 81 mg PO DAILY 03/05/19 


Atorvastatin Calcium [Lipitor 80 mg Tablet] 80 mg PO QHS 03/05/19 


Clonazepam [Klonopin 1 mg Tablet] 1 mg PO BID 03/05/19 


Clopidogrel Bisulfate [Plavix 75 mg Tablet] 75 mg PO DAILY 03/05/19 


Cyanocobalamin (Vitamin B-12) [B-12] 2,500 mcg SL DAILY 03/05/19 


Docusate Sodium [Colace 100 mg Capsule] 100 mg PO DAILY 03/05/19 


Ferrous Sulfate [Feosol 325 mg Tablet] 325 mg PO DAILY 03/05/19 


Furosemide [Lasix 20 mg Tablet] 20 mg PO BID 03/05/19 


Gabapentin [Neurontin 300 mg Capsule] 300 mg PO QHS 03/05/19 


Hydralazine HCl [Apresoline 10 mg Tablet] 10 mg PO Q12 03/05/19 


Insulin Aspart [Novolog Insulin 100 Unit/1 ml 10 ml] 5 unit SUBCUT AC 03/05/19 


Insulin Glargine,Hum.rec.anlog [Basaglar Kwikpen U-100] 30 unit SQ Q12 03/05/19 


Isosorbide Mononitrate [Imdur 30 mg Tablet.er] 30 mg PO DAILY 03/05/19 


Ivabradine HCl [Corlanor] 7.5 mg PO BID 03/05/19 


Lansoprazole [Prevacid 30 Mg Odt Tablet] 30 mg PO Q6AM 03/05/19 


Linagliptin [Tradjenta] 5 mg PO DAILY 03/05/19 


Magnesium Oxide [Mag-Ox 400 mg Tablet] 400 mg PO BID 03/05/19 


Metoprolol Succinate [Toprol  mg Tablet] 100 mg PO Q12 03/05/19 


Multivitamin [Tab-A-Shannan (Multiple Vitamin) Tablet] 1 tab PO DAILY 03/05/19 


Nortriptyline HCl [Pamelor] 75 mg PO QHS 03/05/19 


Prazosin HCl [Minipress] 5 mg PO QHS 03/05/19 


Sertraline HCl [Zoloft] 200 mg PO QHS 03/05/19 


Spironolactone [Aldactone] 50 mg PO DAILY 03/05/19 


Trazodone HCl [Desyrel] 100 mg PO QHS 03/05/19 








Allergies/Adverse Reactions: 


                                        





No Known Allergies Allergy (Verified 10/14/17 00:15)


   











Review of Systems


Constitutional: PRESENT: weakness, weight loss.  ABSENT: chills, fatigue, 

fever(s), headache(s), night sweats


Nose, Mouth, and Throat: ABSENT: sore throat


Cardiovascular: ABSENT: chest pain, dyspnea on exertion, edema, orthropnea, 

palpitations


Respiratory: ABSENT: cough


Gastrointestinal: ABSENT: abdominal pain, diarrhea, dysphagia, heartburn, 

nausea, vomiting


Integumentary: ABSENT: pruritus, rash


Neurological: PRESENT: dizziness, frequent falls, numbness, vertigo.  ABSENT: 

abnormal speech, confusion, convulsions, focal weakness, paresthesias, restless 

legs


Hematologic/Lymphatic: ABSENT: easy bruising, lymphadenopathy





Physical Exam


Vital Signs: 


                                        











Temp Pulse Resp BP Pulse Ox


 


 97.8 F   63   16   167/82 H  100 


 


 03/05/19 17:06  03/05/19 17:06  03/05/19 17:06  03/05/19 17:06  03/05/19 17:06








                                 Intake & Output











 03/04/19 03/05/19 03/06/19





 06:59 06:59 06:59


 


Weight   124.2 kg











General appearance: PRESENT: no acute distress, cooperative, obese


Eye exam: PRESENT: EOMI, PERRLA.  ABSENT: scleral icterus


Mouth exam: PRESENT: moist, neck supple.  ABSENT: dry mucosa


Neck exam: PRESENT: full ROM.  ABSENT: carotid bruit, JVD, tenderness, 

thyromegaly, tracheal deviation


Respiratory exam: PRESENT: clear to auscultation wally, unlabored.  ABSENT: 

accessory muscle use


Cardiovascular exam: PRESENT: irregular rhythm - sinus sharlene with bigeminy.  

ABSENT: gallop, rubs


GI/Abdominal exam: PRESENT: normal bowel sounds, soft.  ABSENT: distended, mass,

 tenderness


Extremities exam: ABSENT: joint swelling, pedal edema, tenderness


Neurological exam: PRESENT: alert, oriented to person, oriented to place, 

oriented to time, oriented to situation, CN II-XII grossly intact, motor sensory

 deficit.  ABSENT: aphasic


Skin exam: PRESENT: dry, normal color, warm





Results


Laboratory Results: 


                                        





                                 03/05/19 10:10 





                                 03/05/19 10:10 





                                        











  03/05/19 03/05/19





  10:10 10:10


 


WBC   5.4


 


RBC   4.39


 


Hgb   14.2


 


Hct   41.5


 


MCV   95


 


MCH   32.5


 


MCHC   34.3


 


RDW   13.6


 


Plt Count   173


 


Seg Neutrophils %   61.8


 


Lymphocytes %   28.6


 


Monocytes %   5.3


 


Eosinophils %   3.7


 


Basophils %   0.6


 


Absolute Neutrophils   3.3


 


Absolute Lymphocytes   1.6


 


Absolute Monocytes   0.3


 


Absolute Eosinophils   0.2


 


Absolute Basophils   0.0


 


Sodium  142.6 


 


Potassium  4.4 


 


Chloride  105 


 


Carbon Dioxide  29 


 


Anion Gap  9 


 


BUN  17 


 


Creatinine  1.71 H 


 


Est GFR ( Amer)  48 L 


 


Est GFR (Non-Af Amer)  40 L 


 


Glucose  125 H 


 


Calcium  9.1 








                                        











  03/05/19 03/05/19





  09:18 10:10


 


Creatine Kinase   116


 


Troponin I  < 0.012 











Impressions: 


                                        





Chest X-Ray  03/05/19 09:52


IMPRESSION:  Cardiomegaly without pulmonary edema.


 








Head CT  03/05/19 09:52


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 

hemisphere


No acute findings


EVIDENCE OF ACUTE STROKE: NO.


 














Assessment & Plan





- Diagnosis


(1) Right sided weakness


Is this a current diagnosis for this admission?: Yes   


Plan: 


will admit to medical services. consider CVA vs TIA. MRI of the head. carotid 

US, echocardiogram. start on plavix, at the very least he has on old CVA and 

warrants plavix. also ensure his statin is maximized. 








(2) Trigeminy


Is this a current diagnosis for this admission?: Yes   


Plan: 


will get cardiology to evaluate patient. may be part of his dizziness 








(3) Acute worsening of stage 3 chronic kidney disease


Is this a current diagnosis for this admission?: Yes   


Plan: 


appears to be at baseline of 1.6-1.8. need to be cautious with nephrotoxic 

medications. monitor closely. 








(4) Diabetes mellitus


Qualifiers: 


   Diabetes mellitus type: type 2   Diabetes mellitus complication status: with 

circulatory complication   Diabetes mellitus complication detail: with other 

circulatory complications 


Is this a current diagnosis for this admission?: Yes   


Plan: 


start on SSI and monitor








(5) HTN (hypertension)


Qualifiers: 


   Hypertension type: essential hypertension   Qualified Code(s): I10 - 

Essential (primary) hypertension   


Is this a current diagnosis for this admission?: Yes   


Plan: 


will allow permissive HTN for now given high likelihood of CVA. will restart 

home medications after 24-48 hours.  monitor closely though








(6) Hyperlipidemia


Qualifiers: 


   Hyperlipidemia type: mixed hyperlipidemia   Qualified Code(s): E78.2 - Mixed 

hyperlipidemia   


Is this a current diagnosis for this admission?: Yes   


Plan: 


needs to be on statin therapy








- Time


Time Spent: 50 to 70 Minutes


Medications reviewed and adjusted accordingly: Yes


Anticipated discharge: Home

## 2019-03-05 NOTE — RADIOLOGY REPORT (SQ)
EXAM DESCRIPTION:  CHEST SINGLE VIEW



COMPLETED DATE/TIME:  3/5/2019 10:30 am



REASON FOR STUDY:  Right sided weakness



COMPARISON:  11/21/2018



EXAM PARAMETERS:  NUMBER OF VIEWS: One view.

TECHNIQUE: Single frontal radiographic view of the chest acquired.

RADIATION DOSE: NA

LIMITATIONS: None.



FINDINGS:  LUNGS AND PLEURA: No opacities, masses or pneumothorax. No pleural effusion.

MEDIASTINUM AND HILAR STRUCTURES: No masses.  Contour normal.

HEART AND VASCULAR STRUCTURES: Cardiomegaly.  No pulmonary edema.

BONES: No acute findings.

HARDWARE: None in the chest.

OTHER: No other significant finding.



IMPRESSION:  Cardiomegaly without pulmonary edema.



TECHNICAL DOCUMENTATION:  JOB ID:  [id number]

 2011 [employer]- All Rights Reserved



Reading location - IP/workstation name: [email or web address]

## 2019-03-06 VITALS — SYSTOLIC BLOOD PRESSURE: 164 MMHG | DIASTOLIC BLOOD PRESSURE: 80 MMHG

## 2019-03-06 LAB
ADD MANUAL DIFF: NO
ALBUMIN SERPL-MCNC: 4.2 G/DL (ref 3.5–5)
ALP SERPL-CCNC: 67 U/L (ref 38–126)
ALT SERPL-CCNC: 40 U/L (ref 21–72)
ANION GAP SERPL CALC-SCNC: 8 MMOL/L (ref 5–19)
APPEARANCE UR: CLEAR
APTT PPP: YELLOW S
AST SERPL-CCNC: 69 U/L (ref 17–59)
BASOPHILS # BLD AUTO: 0 10^3/UL (ref 0–0.2)
BASOPHILS NFR BLD AUTO: 0.5 % (ref 0–2)
BILIRUB DIRECT SERPL-MCNC: 0.3 MG/DL (ref 0–0.4)
BILIRUB SERPL-MCNC: 0.5 MG/DL (ref 0.2–1.3)
BILIRUB UR QL STRIP: NEGATIVE
BUN SERPL-MCNC: 19 MG/DL (ref 7–20)
CALCIUM: 9.3 MG/DL (ref 8.4–10.2)
CHLORIDE SERPL-SCNC: 104 MMOL/L (ref 98–107)
CHOLEST SERPL-MCNC: 120.09 MG/DL (ref 0–200)
CO2 SERPL-SCNC: 30 MMOL/L (ref 22–30)
EOSINOPHIL # BLD AUTO: 0.2 10^3/UL (ref 0–0.6)
EOSINOPHIL NFR BLD AUTO: 4.1 % (ref 0–6)
ERYTHROCYTE [DISTWIDTH] IN BLOOD BY AUTOMATED COUNT: 13.7 % (ref 11.5–14)
GLUCOSE SERPL-MCNC: 99 MG/DL (ref 75–110)
GLUCOSE UR STRIP-MCNC: NEGATIVE MG/DL
HCT VFR BLD CALC: 44.5 % (ref 37.9–51)
HGB BLD-MCNC: 14.9 G/DL (ref 13.5–17)
KETONES UR STRIP-MCNC: NEGATIVE MG/DL
LDLC SERPL DIRECT ASSAY-MCNC: 64 MG/DL (ref ?–100)
LYMPHOCYTES # BLD AUTO: 1.7 10^3/UL (ref 0.5–4.7)
LYMPHOCYTES NFR BLD AUTO: 28.1 % (ref 13–45)
MCH RBC QN AUTO: 31.7 PG (ref 27–33.4)
MCHC RBC AUTO-ENTMCNC: 33.6 G/DL (ref 32–36)
MCV RBC AUTO: 94 FL (ref 80–97)
MONOCYTES # BLD AUTO: 0.4 10^3/UL (ref 0.1–1.4)
MONOCYTES NFR BLD AUTO: 6.4 % (ref 3–13)
NEUTROPHILS # BLD AUTO: 3.7 10^3/UL (ref 1.7–8.2)
NEUTS SEG NFR BLD AUTO: 60.9 % (ref 42–78)
NITRITE UR QL STRIP: NEGATIVE
PH UR STRIP: 7 [PH] (ref 5–9)
PLATELET # BLD: 175 10^3/UL (ref 150–450)
POTASSIUM SERPL-SCNC: 4.1 MMOL/L (ref 3.6–5)
PROT SERPL-MCNC: 7.2 G/DL (ref 6.3–8.2)
PROT UR STRIP-MCNC: NEGATIVE MG/DL
RBC # BLD AUTO: 4.71 10^6/UL (ref 4.35–5.55)
SODIUM SERPL-SCNC: 141.6 MMOL/L (ref 137–145)
SP GR UR STRIP: 1.01
TOTAL CELLS COUNTED % (AUTO): 100 %
TRIGL SERPL-MCNC: 210 MG/DL (ref ?–150)
UROBILINOGEN UR-MCNC: NEGATIVE MG/DL (ref ?–2)
VLDLC SERPL CALC-MCNC: 42 MG/DL (ref 10–31)
WBC # BLD AUTO: 6 10^3/UL (ref 4–10.5)

## 2019-03-06 RX ADMIN — Medication SCH ML: at 05:46

## 2019-03-06 RX ADMIN — INSULIN HUMAN SCH: 100 INJECTION, SOLUTION PARENTERAL at 15:45

## 2019-03-06 RX ADMIN — CLOPIDOGREL BISULFATE SCH MG: 75 TABLET, FILM COATED ORAL at 09:13

## 2019-03-06 RX ADMIN — INSULIN HUMAN SCH: 100 INJECTION, SOLUTION PARENTERAL at 08:17

## 2019-03-06 RX ADMIN — Medication SCH ML: at 13:25

## 2019-03-06 RX ADMIN — INSULIN HUMAN SCH: 100 INJECTION, SOLUTION PARENTERAL at 12:52

## 2019-03-06 NOTE — PDOC TRANSFER SUMMARY
General


Admission Date/PCP: 


  03/05/19 13:57





  NIKI GRANT MD





Admission Date: 03/05/19


Transfer Date: 03/06/19


Accepting Facility: [facility]


Accepting Physician: Dr Smith


Resuscitation Status: Full Code





- Transfer Diagnosis


(1) Trigeminy


Is this a current diagnosis for this admission?: Yes   


Diagnosis Summary: 


He has continued to have bradycardia with bigeminy and trigeminy during his 

admission. HR has ranged from 20's-50's. Patient continues to have dizziness 

when sitting up and worse with ambulation. He has refused evaluation by our 

cardiologist and as such his son has initiated transfer to [facility] where 

his cardiologist is located. I feel his cardiac rhythm is the cause for his 

dizziness and numbness. He is being transferred to [facility] at the request 

of the patient for further evaluation. 








(2) Right sided weakness


Is this a current diagnosis for this admission?: Yes   


Diagnosis Summary: 


Has nearly completely resolved during this admission. He complains of some 

bilateral numbness in his toes








(3) Acute worsening of stage 3 chronic kidney disease


Is this a current diagnosis for this admission?: Yes   


Diagnosis Summary: 


Renal function has been stable during his admission








(4) Diabetes mellitus


Is this a current diagnosis for this admission?: Yes   


Diagnosis Summary: 


BGL have been stable during his admission. 








(5) HTN (hypertension)


Is this a current diagnosis for this admission?: Yes   


Diagnosis Summary: 


Initially his home medications were help due to allowing permissive HTN with the

possibility of new CVA. 








(6) Hyperlipidemia


Is this a current diagnosis for this admission?: Yes   


Diagnosis Summary: 


Continue statin therapy








- Transfer Medications


Home Medications: 


                                        





Aspirin [Ecotrin 81 mg EC Tablet] 81 mg PO DAILY 03/05/19 


Atorvastatin Calcium [Lipitor 80 mg Tablet] 80 mg PO QHS 03/05/19 


Clonazepam [Klonopin 1 mg Tablet] 1 mg PO BID 03/05/19 


Clopidogrel Bisulfate [Plavix 75 mg Tablet] 75 mg PO DAILY 03/05/19 


Cyanocobalamin (Vitamin B-12) [B-12] 2,500 mcg SL DAILY 03/05/19 


Docusate Sodium [Colace 100 mg Capsule] 100 mg PO DAILY 03/05/19 


Ferrous Sulfate [Feosol 325 mg Tablet] 325 mg PO DAILY 03/05/19 


Furosemide [Lasix 20 mg Tablet] 20 mg PO BID 03/05/19 


Gabapentin [Neurontin 300 mg Capsule] 300 mg PO QHS 03/05/19 


Hydralazine HCl [Apresoline 10 mg Tablet] 10 mg PO Q12 03/05/19 


Insulin Aspart [Novolog Insulin 100 Unit/1 ml 10 ml] 5 unit SUBCUT AC 03/05/19 


Insulin Glargine,Hum.rec.anlog [Basaglar Kwikpen U-100] 30 unit SQ Q12 03/05/19 


Isosorbide Mononitrate [Imdur 30 mg Tablet.er] 30 mg PO DAILY 03/05/19 


Ivabradine HCl [Corlanor] 7.5 mg PO BID 03/05/19 


Lansoprazole [Prevacid 30 Mg Odt Tablet] 30 mg PO Q6AM 03/05/19 


Linagliptin [Tradjenta] 5 mg PO DAILY 03/05/19 


Magnesium Oxide [Mag-Ox 400 mg Tablet] 400 mg PO BID 03/05/19 


Metoprolol Succinate [Toprol  mg Tablet] 100 mg PO Q12 03/05/19 


Multivitamin [Tab-A-Shannan (Multiple Vitamin) Tablet] 1 tab PO DAILY 03/05/19 


Nortriptyline HCl [Pamelor] 75 mg PO QHS 03/05/19 


Prazosin HCl [Minipress] 5 mg PO QHS 03/05/19 


Sertraline HCl [Zoloft] 200 mg PO QHS 03/05/19 


Spironolactone [Aldactone] 50 mg PO DAILY 03/05/19 


Trazodone HCl [Desyrel] 100 mg PO QHS 03/05/19 








Transfer Medications: 


                               Current Medications





Atorvastatin Calcium (Lipitor 80 Mg Tablet)  80 mg PO QHS VIANEY


   Stop: 04/04/19 21:59


   Last Admin: 03/05/19 21:32 Dose:  80 mg


   Documented by: 


Clopidogrel Bisulfate (Plavix 75 Mg Tablet)  75 mg PO DAILY [facility]


   Stop: 04/04/19 14:59


   Last Admin: 03/06/19 09:13 Dose:  75 mg


   Documented by: 


Dextrose (Dextrose Inj 50% Syringe (25 Gm/50 Ml))  12.5 gm IV PRN PRN; Protocol


   PRN Reason: FOR BG 50-69 IN ALERT PATIENT


   Stop: 04/04/19 14:18


Dextrose (Dextrose Inj 50% Syringe (25 Gm/50 Ml))  25 gm IV PRN PRN; Protocol


   PRN Reason: PER PROTOCOL


   Stop: 04/04/19 14:18


Enoxaparin Sodium (Lovenox Inj 40 Mg/0.4 Ml Disp.Syrin)  40 mg SUBCUT DAILY [facility]


   Stop: 04/05/19 09:59


   Last Admin: 03/06/19 09:13 Dose:  40 mg


   Documented by: 


Glucagon (Glucagen Inj 1 Mg Vial)  1 mg IM PRN PRN; Protocol


   PRN Reason: Evaluate for BG < 70


   Stop: 04/04/19 14:18


Glucose (Glutose 40% Gel 15 Gm Tube)  15 gm PO PRN PRN; Protocol


   PRN Reason: FOR BG 50-69 IN ALERT PATIENT


   Stop: 04/04/19 14:18


Glucose (Glutose 40% Gel 15 Gm Tube)  30 gm PO PRN PRN; Protocol


   PRN Reason: FOR BG < 50 IN ALERT PATIENT 


   Stop: 04/04/19 14:18


Insulin Human Regular (Humulin R (Pyxis) Insulin 100 Unit/Ml 3ml)  0 - 12 unit 

SUBCUT ACHS [facility]; Protocol


   Stop: 04/04/19 15:59


   Last Admin: 03/06/19 08:17 Dose:  Not Given


   Documented by: 


Sodium Chloride (Saline Flush 2.5 Ml Monoject Prefil Syrin)  2.5 ml IV Q8 [facility]


   Stop: 04/04/19 21:59


   Last Admin: 03/06/19 05:46 Dose:  2.5 ml


   Documented by: 











- Allergies


Allergies/Adverse Reactions: 


                                        





No Known Allergies Allergy (Verified 10/14/17 00:15)


   











- Diet/Activity


Discharge Diet: Cardiac, Diabetic





Hospital Course


Hospital Course: 





during his admission he has continued to have bradycardia with bigeminy and 

trigeminy. HR has dropped into the low 20's. Patient still complains of 

dizziness when sitting and worse when ambulating. His numbness has almost c

ompletely resolved. He complains of slight numbness in his toes bilaterally on 

the day of discharge. 





CT of the head in the ER did show an old right occipital and right cerebellar 

infarcts, patient was unaware of these. 





Physical Exam


Vital Signs: 


                                        











Temp Pulse Resp BP Pulse Ox


 


 97.8 F   34 L  20   139/85 H  100 


 


 03/06/19 07:58  03/06/19 08:00  03/06/19 08:00  03/06/19 08:00  03/06/19 08:00








                                 Intake & Output











 03/05/19 03/06/19 03/07/19





 06:59 06:59 06:59


 


Intake Total  100 


 


Output Total  325 


 


Balance  -225 


 


Weight  124.2 kg 











General appearance: PRESENT: no acute distress, cooperative, obese


Eye exam: PRESENT: EOMI, PERRLA.  ABSENT: scleral icterus


Mouth exam: PRESENT: moist, neck supple


Neck exam: PRESENT: full ROM, thyromegaly.  ABSENT: JVD, tenderness, tracheal 

deviation


Respiratory exam: PRESENT: clear to auscultation wally, unlabored.  ABSENT: 

accessory muscle use


Cardiovascular exam: PRESENT: bradycardia, irregular rhythm


GI/Abdominal exam: PRESENT: normal bowel sounds, soft.  ABSENT: tenderness


Musculoskeletal exam: PRESENT: full ROM


Neurological exam: PRESENT: alert, oriented to person, oriented to place, 

oriented to time, oriented to situation


Psychiatric exam: ABSENT: anxious


Skin exam: PRESENT: dry, normal color, warm





Results


Laboratory Results: 


                                        





                                 03/06/19 05:16 





                                 03/06/19 05:16 





                                        











  03/06/19 03/06/19 03/06/19





  05:16 05:16 05:43


 


WBC  6.0  


 


RBC  4.71  


 


Hgb  14.9  


 


Hct  44.5  


 


MCV  94  


 


MCH  31.7  


 


MCHC  33.6  


 


RDW  13.7  


 


Plt Count  175  


 


Seg Neutrophils %  60.9  


 


Lymphocytes %  28.1  


 


Monocytes %  6.4  


 


Eosinophils %  4.1  


 


Basophils %  0.5  


 


Absolute Neutrophils  3.7  


 


Absolute Lymphocytes  1.7  


 


Absolute Monocytes  0.4  


 


Absolute Eosinophils  0.2  


 


Absolute Basophils  0.0  


 


Sodium   141.6 


 


Potassium   4.1 


 


Chloride   104 


 


Carbon Dioxide   30 


 


Anion Gap   8 


 


BUN   19 


 


Creatinine   1.78 H 


 


Est GFR ( Amer)   46 L 


 


Est GFR (Non-Af Amer)   38 L 


 


Glucose   99 


 


Calcium   9.3 


 


Total Bilirubin   0.5 


 


AST   69 H 


 


ALT   40 


 


Alkaline Phosphatase   67 


 


Total Protein   7.2 


 


Albumin   4.2 


 


Triglycerides   210 H 


 


Cholesterol   120.09 


 


LDL Cholesterol Direct   64 


 


VLDL Cholesterol   42.0 H 


 


HDL Cholesterol   30 L 


 


Urine Color    YELLOW


 


Urine Appearance    CLEAR


 


Urine pH    7.0


 


Ur Specific Gravity    1.013


 


Urine Protein    NEGATIVE


 


Urine Glucose (UA)    NEGATIVE


 


Urine Ketones    NEGATIVE


 


Urine Blood    NEGATIVE


 


Urine Nitrite    NEGATIVE


 


Ur Leukocyte Esterase    NEGATIVE


 


Urine WBC (Auto)    0


 


Urine RBC (Auto)    4








                                        











  03/05/19 03/05/19





  09:18 10:10


 


Creatine Kinase   116


 


Troponin I  < 0.012 











Impressions: 


                                        





Chest X-Ray  03/05/19 09:52


IMPRESSION:  Cardiomegaly without pulmonary edema.


 








Head CT  03/05/19 09:52


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 

hemisphere


No acute findings


EVIDENCE OF ACUTE STROKE: NO.


 














Plan


Discharge Plan: 


patient accepted by Samira Smith. Will transfer when ALS ground 

available





Time Spent: Greater than 30 Minutes

## 2019-03-06 NOTE — XCELERA REPORT
[address] [address]

                               Tel: [phone]

                               Fax: [phone]



                      Transthoracic Echocardiogram Report

_______________________________________________________________________________



Name: KENNY GUEVARA

MRN: [id number]                           Age: 68 yrs

Gender: Male                              : 1950

Patient Status: Inpatient                 Patient Location: [address] [place], [place] [address]

Account #: [id number]

Study Date: 2019 07:50 PM

Accession #: [id number]

_______________________________________________________________________________



Height: 71 in        Weight: 230 lb        BSA: 2.2 m2

_______________________________________________________________________________

Procedure: A two-dimensional transthoracic echocardiogram with color flow and

Doppler was performed. Study Quality: Poor. The study was technically

difficult with many images being suboptimal in quality.

Reason For Study: CVA



History: CVA.

Ordering Physician: ARABELLA LOTT



Performed By: Gabbie Nugent

_______________________________________________________________________________



Interpretation Summary

There is no obvious cardiac source of embolus noted on this transthoracic

echocardiogram. Follow-up with a ADAM is suggested if cardiac source is still

suspected. Probably no reional wall motion abnormality.Not a good study to

assess for clots,but no obvios clots seen.No LVH.LVEF probably normal at 60%.

Doppler measurements suggest impaired left ventricular relaxation, which is

associated with grade I/IV or mild diastolic dysfunction

The right ventricle is not well visualized secondary to technical limitations

Suspect RV enlargement.

The left atrial size is normal.

There is no evidence of mitral valve prolapse.

There is no mitral valve stenosis.

Probably trace AR.

There is no tricuspid stenosis.

There is a trace amount of tricuspid regurgitation

RVSP is 33 mm of Hg , with RA mean of 10.

There is mild pulmonary hypertension by echo

There is no pericardial effusion.

There is no obvious cardiac source of embolus noted on this transthoracic

echocardiogram. Follow-up with a ADAM is suggested if cardiac source is still

suspected



MMode/2D Measurements & Calculations

RVDd: 3.0 cm   LVIDd: 6.2 cm   FS: 33.9 %             Ao root diam: 3.0 cm



IVSd: 0.94 cm  LVIDs: 4.1 cm   EDV(Teich): 192.8 ml   Ao root area: 6.9 cm2

               LVPWd: 0.85 cm  ESV(Teich): 73.7 ml    LA dimension: 3.3 cm

                               EF(Teich): 61.7 %



Doppler Measurements & Calculations

MV E max wilfred:      MV P1/2t max wilfred:    Ao V2 max:         AI max wilfred:

68.1 cm/sec        70.6 cm/sec          138.1 cm/sec       200.7 cm/sec

MV A max wilfred:      MV P1/2t: 61.0 msec  Ao max PG:         AI max P.7 cm/sec        MVA(P1/2t): 3.6 cm2  7.6 mmHg           16.2 mmHg

MV E/A: 0.70       MV dec slope:                           AI dec slope:

                                                           45.7 cm/sec2

                   339.0 cm/sec2                           AI P1/2t: 1287 msec

                   MV dec time: 0.26 sec

        _______________________________________________________________

LV V1 max PG:      PA V2 max:           PI end-d wilfred:      TR max wilfred:

2.4 mmHg           90.9 cm/sec          95.0 cm/sec        238.9 cm/sec

LV V1 max:         PA max PG: 3.3 mmHg                     TR max P.8 cm/sec                                                22.8 mmHg



        _______________________________________________________________

AV P1/2t-pr_phl:   MV P1/2t-pr_phl:

1345 msec          61.0 msec



Left Ventricle

Probably no reional wall motion abnormality.Not a good study to assess for

clots,but no obvios clots seen.No LVH.LVEF probably normal at 60%. Doppler

measurements suggest impaired left ventricular relaxation, which is associated

with grade I/IV or mild diastolic dysfunction.



Right Ventricle

The right ventricle is not well visualized secondary to technical limitations.

Suspect RV enlargement.





Atria

Right atrium not well visualized secondary to technical limitations. The left

atrial size is normal.



Mitral Valve

There is no evidence of mitral valve prolapse. There is no vegetation seen on

the mitral valve. There is no mitral valve stenosis. There is a trace amount

of mitral regurgitation.



Aortic Valve

There is no aortic valve stenosis. Probably trace AR.



Tricuspid Valve

There is no tricuspid stenosis. There is a trace amount of tricuspid

regurgitation. RVSP is 33 mm of Hg , with RA mean of 10. There is mild

pulmonary hypertension by echo.



Pulmonic Valve

There is no pulmonic valvular stenosis. There is a trace amount of pulmonic

regurgitation.





Great Vessels

The aortic root is normal size. The inferior vena cava was not visualized.



Effusions

There is no pericardial effusion.



_______________________________________________________________________________



_______________________________________________________________________________

Electronically signed by:      Tanisha Corley      on 2019 11:48 AM



CC: ARABELLA LOTT

>

Tanisha Corley

## 2019-04-03 ENCOUNTER — HOSPITAL ENCOUNTER (EMERGENCY)
Dept: HOSPITAL 62 - ER | Age: 69
Discharge: HOME | End: 2019-04-03
Payer: MEDICARE

## 2019-04-03 VITALS — DIASTOLIC BLOOD PRESSURE: 70 MMHG | SYSTOLIC BLOOD PRESSURE: 118 MMHG

## 2019-04-03 DIAGNOSIS — R10.84: ICD-10-CM

## 2019-04-03 DIAGNOSIS — Z87.891: ICD-10-CM

## 2019-04-03 DIAGNOSIS — I12.9: ICD-10-CM

## 2019-04-03 DIAGNOSIS — K59.00: Primary | ICD-10-CM

## 2019-04-03 DIAGNOSIS — J44.9: ICD-10-CM

## 2019-04-03 DIAGNOSIS — N18.9: ICD-10-CM

## 2019-04-03 DIAGNOSIS — I49.3: ICD-10-CM

## 2019-04-03 DIAGNOSIS — E11.22: ICD-10-CM

## 2019-04-03 DIAGNOSIS — M54.9: ICD-10-CM

## 2019-04-03 DIAGNOSIS — Z90.49: ICD-10-CM

## 2019-04-03 LAB
ADD MANUAL DIFF: NO
ALBUMIN SERPL-MCNC: 4.2 G/DL (ref 3.5–5)
ALP SERPL-CCNC: 80 U/L (ref 38–126)
ALT SERPL-CCNC: 56 U/L (ref 21–72)
ANION GAP SERPL CALC-SCNC: 7 MMOL/L (ref 5–19)
APPEARANCE UR: CLEAR
APTT PPP: YELLOW S
AST SERPL-CCNC: 72 U/L (ref 17–59)
BASOPHILS # BLD AUTO: 0 10^3/UL (ref 0–0.2)
BASOPHILS NFR BLD AUTO: 0.3 % (ref 0–2)
BILIRUB DIRECT SERPL-MCNC: 0.3 MG/DL (ref 0–0.4)
BILIRUB SERPL-MCNC: 0.5 MG/DL (ref 0.2–1.3)
BILIRUB UR QL STRIP: NEGATIVE
BUN SERPL-MCNC: 22 MG/DL (ref 7–20)
CALCIUM: 9.4 MG/DL (ref 8.4–10.2)
CHLORIDE SERPL-SCNC: 106 MMOL/L (ref 98–107)
CO2 SERPL-SCNC: 29 MMOL/L (ref 22–30)
EOSINOPHIL # BLD AUTO: 0.3 10^3/UL (ref 0–0.6)
EOSINOPHIL NFR BLD AUTO: 4.4 % (ref 0–6)
ERYTHROCYTE [DISTWIDTH] IN BLOOD BY AUTOMATED COUNT: 13.4 % (ref 11.5–14)
GLUCOSE SERPL-MCNC: 108 MG/DL (ref 75–110)
GLUCOSE UR STRIP-MCNC: NEGATIVE MG/DL
HCT VFR BLD CALC: 44.3 % (ref 37.9–51)
HGB BLD-MCNC: 15.1 G/DL (ref 13.5–17)
KETONES UR STRIP-MCNC: NEGATIVE MG/DL
LIPASE SERPL-CCNC: 103 U/L (ref 23–300)
LYMPHOCYTES # BLD AUTO: 1.4 10^3/UL (ref 0.5–4.7)
LYMPHOCYTES NFR BLD AUTO: 24.7 % (ref 13–45)
MCH RBC QN AUTO: 32 PG (ref 27–33.4)
MCHC RBC AUTO-ENTMCNC: 34.1 G/DL (ref 32–36)
MCV RBC AUTO: 94 FL (ref 80–97)
MONOCYTES # BLD AUTO: 0.4 10^3/UL (ref 0.1–1.4)
MONOCYTES NFR BLD AUTO: 6.5 % (ref 3–13)
NEUTROPHILS # BLD AUTO: 3.8 10^3/UL (ref 1.7–8.2)
NEUTS SEG NFR BLD AUTO: 64.1 % (ref 42–78)
NITRITE UR QL STRIP: NEGATIVE
PH UR STRIP: 5 [PH] (ref 5–9)
PLATELET # BLD: 199 10^3/UL (ref 150–450)
POTASSIUM SERPL-SCNC: 4.2 MMOL/L (ref 3.6–5)
PROT SERPL-MCNC: 7.7 G/DL (ref 6.3–8.2)
PROT UR STRIP-MCNC: NEGATIVE MG/DL
RBC # BLD AUTO: 4.71 10^6/UL (ref 4.35–5.55)
SODIUM SERPL-SCNC: 142.4 MMOL/L (ref 137–145)
SP GR UR STRIP: 1.02
TOTAL CELLS COUNTED % (AUTO): 100 %
UROBILINOGEN UR-MCNC: 2 MG/DL (ref ?–2)
WBC # BLD AUTO: 5.9 10^3/UL (ref 4–10.5)

## 2019-04-03 PROCEDURE — 36415 COLL VENOUS BLD VENIPUNCTURE: CPT

## 2019-04-03 PROCEDURE — 93005 ELECTROCARDIOGRAM TRACING: CPT

## 2019-04-03 PROCEDURE — 81001 URINALYSIS AUTO W/SCOPE: CPT

## 2019-04-03 PROCEDURE — 84484 ASSAY OF TROPONIN QUANT: CPT

## 2019-04-03 PROCEDURE — 74176 CT ABD & PELVIS W/O CONTRAST: CPT

## 2019-04-03 PROCEDURE — 71046 X-RAY EXAM CHEST 2 VIEWS: CPT

## 2019-04-03 PROCEDURE — 99285 EMERGENCY DEPT VISIT HI MDM: CPT

## 2019-04-03 PROCEDURE — 83690 ASSAY OF LIPASE: CPT

## 2019-04-03 PROCEDURE — 93010 ELECTROCARDIOGRAM REPORT: CPT

## 2019-04-03 PROCEDURE — 80053 COMPREHEN METABOLIC PANEL: CPT

## 2019-04-03 PROCEDURE — 85025 COMPLETE CBC W/AUTO DIFF WBC: CPT

## 2019-04-03 NOTE — RADIOLOGY REPORT (SQ)
EXAM DESCRIPTION: 



XR CHEST 2 VIEWS



COMPLETED DATE/TME:  04/03/2019 21:04



CLINICAL HISTORY: 



68 years, Male, chest pain/abdominal pain



COMPARISON:

3/5/2019 chest



NUMBER OF VIEWS:

2



TECHNIQUE:

2 view chest



LIMITATIONS:

None.



FINDINGS:



Heart size is stable. Stable postsurgical change. Lungs are

clear. No pneumothorax



IMPRESSION:



No acute cardiopulmonary process

 



copyright 2011 [employer]- All Rights Reserved

## 2019-04-03 NOTE — ER DOCUMENT REPORT
ED General





- General


Chief Complaint: Abdominal Pain


Stated Complaint: ABDOMINAL PAIN


Time Seen by Provider: 04/03/19 18:55


Primary Care Provider: 


NIKI GRANT MD [Primary Care Provider] - Follow up as needed


Mode of Arrival: Ambulatory


Information source: Patient


TRAVEL OUTSIDE OF THE [place] IN LAST 30 DAYS: No





- HPI


Patient complains to provider of: Right upper quadrant pain, right flank pain, 

right back pain


Onset: Other - Symptoms in some form or fashion the past couple of weeks


Onset/Duration: Waxing and waning


Quality of pain: Sharp


Severity: Severe


Associated symptoms: denies: Chills, Fever


Exacerbated by: Movement, Coughing, Deep breathing


Relieved by: Denies





- Related Data


Allergies/Adverse Reactions: 


                                        





No Known Allergies Allergy (Verified 04/03/19 18:13)


   











Past Medical History





- General


Information source: Patient





- Social History


Smoking Status: Former Smoker


Family History: Reviewed & Not Pertinent, DM, Hypertension


Patient has suicidal ideation: No


Patient has homicidal ideation: No





- Past Medical History


Cardiac Medical History: Reports: Hx Congestive Heart Failure, Hx Heart Attack, 

Hx Hypercholesterolemia, Hx Hypertension


Pulmonary Medical History: Reports: Hx COPD


Endocrine Medical History: Reports: Hx Diabetes Mellitus Type 2 - IDDM


Renal/ Medical History: Reports: Hx Kidney Stones, Hx Renal Insufficiency.  

Denies: Hx Peritoneal Dialysis


GI Medical History: Reports: Hx Gastroesophageal Reflux Disease


Psychiatric Medical History: Reports: Hx Depression


Past Surgical History: Reports: Hx Abdominal Surgery - Nissen fundoplication, Hx

Cardiac Surgery - cardioMEMS implanted, Hx Cholecystectomy, Hx Tonsillectomy





- Immunizations


Hx Diphtheria, Pertussis, Tetanus Vaccination: Yes


Hx Pneumococcal Vaccination: 01/01/13





Review of Systems





- Review of Systems


Notes: 





Constitutional:  No fevers. No chills.





EENT: No eye redness. No eye pain. No ear pain. No sore throat.





Cardiovascular:  No chest pain. No palpitations.





Respiratory: No cough. No shortness of breath. No respiratory distress.





Gastrointestinal: Positive for abdominal pain. No nausea, vomiting, or diarrhea.

 Positive for right flank pain





Genitourinary: Atraumatic. No lesions. No pain. No discharge.





Musculoskeletal: Atraumatic. No swelling. No deformities.





Skin: No rash or lesions.





Lymphatic: No swollen lymph nodes.





Neurologic: No headache. No syncope.





Psychiatric: No suicidal or homicidal ideation.





Physical Exam





- Vital signs


Vitals: 


                                        











Temp Pulse Resp BP Pulse Ox


 


 98.1 F   80   18   151/92 H  98 


 


 04/03/19 18:15  04/03/19 18:15  04/03/19 18:15  04/03/19 18:15  04/03/19 18:15














- Notes


Notes: 





General: Well-developed, well-nourished. In no acute distress. Non-toxic 

appearing.





Cardiac: Well-perfused. Regular rate and rhythm. No murmurs, rubs, or gallops. 





Pulmonary: No respiratory distress. No cyanosis. Bilateral lung fields are clear

to auscultation.





Abdominal: Non-distended. Non-rigid. Bowels sounds are present in all four 

quadrants. No guarding or rebound.  Mild tenderness in the right upper quadrant 

region.





HEENT: Head is atraumatic. Conjunctivae not reddened. No tearing. PERRL. EOMI. 

Orbits atraumatic. No periorbital swelling or erythema. Oropharynx is without 

erythema, swelling, or exudates.





Neck: Supple. No adenopathy. No meningismus.





Dermatologic: Warm with good turgor. No rash. Atraumatic.





Chest: Atraumatic. No chest wall tenderness to palpation.





Musculoskeletal: Right-sided posterior chest wall tenderness.  No CVA 

tenderness.





Genitourinary: Examination deferred





Neurologic: No gross neurologic deficits.





Psychiatric: Normal mood. 











Course





- Re-evaluation


Re-evalutation: 





04/03/19 21:07


Patient has normal vital signs.  This pain seems to be fairly movement dependent

and apparently is sharp in nature with movement coughing taking a deep breath.  

Sounds like it is been going on quite a while.  Doubt that this is like a 

retained stone in the common bile duct.  History of cholecystectomy.  Question 

possible kidney stone, pancreatitis, atypical cardiac presentation





- Vital Signs


Vital signs: 


                                        











Temp Pulse Resp BP Pulse Ox


 


 98.1 F   80   18   151/92 H  98 


 


 04/03/19 18:15  04/03/19 18:15  04/03/19 18:15  04/03/19 18:15  04/03/19 18:15














- Laboratory


Result Diagrams: 


                                 04/03/19 19:31





                                 04/03/19 19:31


Laboratory results interpreted by me: 


                                        











  04/03/19 04/03/19





  19:31 19:31


 


BUN  22 H 


 


Creatinine  1.80 H 


 


Est GFR ( Amer)  45 L 


 


Est GFR (Non-Af Amer)  38 L 


 


AST  72 H 


 


Urine Urobilinogen   2.0 H














- Diagnostic Test


Radiology reviewed: Reports reviewed





- EKG Interpretation by Me


EKG shows normal: Sinus rhythm


Rate: Normal


Rhythm: PVC's





Discharge





- Discharge


Clinical Impression: 


Chronic kidney disease


Qualifiers:


 Chronic kidney disease stage: unspecified stage Qualified Code(s): N18.9 - 

Chronic kidney disease, unspecified





Abdominal pain


Qualifiers:


 Abdominal location: generalized Qualified Code(s): R10.84 - Generalized 

abdominal pain





Constipation


Qualifiers:


 Constipation type: unspecified constipation type Qualified Code(s): K59.00 - 

Constipation, unspecified





Condition: Good


Instructions:  Abdominal Pain ([facility]), Bulk Laxatives, Low-Fat Diet ([facility])


Additional Instructions: 


Please add MiraLAX and magnesium citrate to help clear up some of the c

onstipation is developed in your abdomen, follow-up with your primary care 

doctor in the next 1-2 days.


Referrals: 


NIKI GRANT MD [Primary Care Provider] - Follow up as needed


Print Language: English

## 2019-04-03 NOTE — ER DOCUMENT REPORT
ED Medical Screen (RME)





- General


Chief Complaint: Abdominal Pain


Stated Complaint: ABDOMINAL PAIN


Time Seen by Provider: 04/03/19 18:55


Primary Care Provider: 


NIKI GRANT MD [Primary Care Provider] - Follow up as needed


TRAVEL OUTSIDE OF THE [place] IN LAST 30 DAYS: No





- HPI


Notes: 





04/03/19 18:59


Patient is a 68-year-old male with a significant cardiac history who presents 

emergency department complaining of right upper quadrant/upper abdominal pain 

that is worse with movement and pushing in that area.  Patient states that the 

pain originally started in his back, but made its way around to the abdomen.  He

does report a history of a cholecystectomy.  He is otherwise eating and drinking

without any difficulties or changes in his symptoms.  He is urinating normally 

and having normal bowel movements every 1-2 days.  Denies drug allergies.  

Denies any headache, fever, URI, sore throat, chest pain, palpitations, syncope,

cough, shortness of breath, wheeze, dyspnea, nausea/vomiting/diarrhea, urinary 

retention, dysuria, hematuria, or rash.








I have treated and performed a rapid initial assessment of this patient.  A 

comprehensive ED assessment and evaluation of the patient, analysis of test 

results and completion of medical decision making process will be conducted by 

additional ED providers.





PHYSICAL EXAMINATION:





GENERAL: Well-appearing, well-nourished and in no acute distress.  A&Ox4.  

Answers questions appropriately.





LUNGS: Breath sounds clear to auscultation bilaterally and equal.  No wheezes 

rales or rhonchi.





HEART: Regular rate and rhythm without murmurs, rubs, gallops.





ABDOMEN: Soft, nondistended abdomen.  No guarding, no rebound.  Normal bowel 

sounds present.  No CVA tenderness bilaterally.  + Reproducible epigastric 

tenderness (cannot elicit thorough abd exam w/o table, however).





Extremities:  No cyanosis, clubbing, or edema b/l.  





NEUROLOGICAL: Normal speech, normal gait. 





PSYCH: Normal mood, normal affect.














- Related Data


Allergies/Adverse Reactions: 


                                        





No Known Allergies Allergy (Verified 04/03/19 18:13)


   











Past Medical History





- Past Medical History


Cardiac Medical History: Reports: Hx Congestive Heart Failure, Hx Heart Attack, 

Hx Hypercholesterolemia, Hx Hypertension


Pulmonary Medical History: Reports: Hx COPD


Endocrine Medical History: Reports: Hx Diabetes Mellitus Type 2 - IDDM


Renal/ Medical History: Reports: Hx Kidney Stones, Hx Renal Insufficiency.  

Denies: Hx Peritoneal Dialysis


GI Medical History: Reports: Hx Gastroesophageal Reflux Disease


Psychiatric Medical History: Reports: Hx Depression


Past Surgical History: Reports: Hx Abdominal Surgery - Nissen fundoplication, Hx

Cardiac Surgery - cardioMEMS implanted, Hx Cholecystectomy, Hx Tonsillectomy





- Immunizations


Hx Diphtheria, Pertussis, Tetanus Vaccination: Yes





Physical Exam





- Vital signs


Vitals: 





                                        











Temp Pulse Resp BP Pulse Ox


 


 98.1 F   80   18   151/92 H  98 


 


 04/03/19 18:15  04/03/19 18:15  04/03/19 18:15  04/03/19 18:15  04/03/19 18:15














Course





- Vital Signs


Vital signs: 





                                        











Temp Pulse Resp BP Pulse Ox


 


 98.1 F   80   18   151/92 H  98 


 


 04/03/19 18:15  04/03/19 18:15  04/03/19 18:15  04/03/19 18:15  04/03/19 18:15














Doctor's Discharge





- Discharge


Referrals: 


NIKI GRANT MD [Primary Care Provider] - Follow up as needed

## 2019-04-03 NOTE — RADIOLOGY REPORT (SQ)
EXAM DESCRIPTION: 



CT ABDOMEN PELVIS WITHOUT IV CONTRAST



COMPLETED DATE/TME:  04/03/2019 20:59



CLINICAL HISTORY: 



68 years, Male, right flank pain to abdomen



COMPARISON:

8/21/2018 CT



TECHNIQUE:

415  Images stored on PACS.

 

All CT scanners at this facility use dose modulation, iterative

reconstruction, and/or weight based dosing when appropriate to

reduce radiation dose to as low as reasonably achievable (ALARA).





[facility]: Dose Right [facility]: CareDose   [facility]: Dose Right    [facility]:

Teradose 4D    [facility]: [employer] Technologies



LIMITATIONS:

None.



FINDINGS:



Visualized lung bases are unremarkable. Osseous structures are

grossly intact. Small hiatal hernia. Limited evaluation of the

liver, spleen, adrenal glands, pancreas, kidneys are

unremarkable. No gross evidence for bowel obstruction. Abundant

stool in the colon. No free air or free fluid.





IMPRESSION:



Abundant stool in the colon

 

TECHNICAL DOCUMENTATION:



Quality ID # 436: Final reports with documentation of one or more

dose reduction techniques (e.g., Automated exposure control,

adjustment of the mA and/or kV according to patient size, use of

iterative reconstruction technique)



copyright 2011 [employer]- All Rights Reserved

## 2019-07-03 ENCOUNTER — HOSPITAL ENCOUNTER (OUTPATIENT)
Dept: HOSPITAL 62 - OD | Age: 69
End: 2019-07-03
Attending: PHYSICIAN ASSISTANT
Payer: MEDICARE

## 2019-07-03 DIAGNOSIS — R80.9: ICD-10-CM

## 2019-07-03 DIAGNOSIS — N18.3: ICD-10-CM

## 2019-07-03 DIAGNOSIS — R31.9: ICD-10-CM

## 2019-07-03 DIAGNOSIS — E11.22: Primary | ICD-10-CM

## 2019-07-03 LAB
ANION GAP SERPL CALC-SCNC: 10 MMOL/L (ref 5–19)
APPEARANCE UR: CLEAR
APTT PPP: YELLOW S
BILIRUB UR QL STRIP: NEGATIVE
BUN SERPL-MCNC: 21 MG/DL (ref 7–20)
CALCIUM: 9.2 MG/DL (ref 8.4–10.2)
CHLORIDE SERPL-SCNC: 103 MMOL/L (ref 98–107)
CO2 SERPL-SCNC: 28 MMOL/L (ref 22–30)
ERYTHROCYTE [DISTWIDTH] IN BLOOD BY AUTOMATED COUNT: 13.6 % (ref 11.5–14)
GLUCOSE SERPL-MCNC: 124 MG/DL (ref 75–110)
GLUCOSE UR STRIP-MCNC: NEGATIVE MG/DL
HCT VFR BLD CALC: 44.3 % (ref 37.9–51)
HGB BLD-MCNC: 14.6 G/DL (ref 13.5–17)
KETONES UR STRIP-MCNC: NEGATIVE MG/DL
MCH RBC QN AUTO: 30.9 PG (ref 27–33.4)
MCHC RBC AUTO-ENTMCNC: 33 G/DL (ref 32–36)
MCV RBC AUTO: 94 FL (ref 80–97)
NITRITE UR QL STRIP: NEGATIVE
PH UR STRIP: 6 [PH] (ref 5–9)
PHOSPHATE SERPL-MCNC: 3.4 MG/DL (ref 2.5–4.5)
PLATELET # BLD: 189 10^3/UL (ref 150–450)
POTASSIUM SERPL-SCNC: 4.2 MMOL/L (ref 3.6–5)
PROT UR STRIP-MCNC: 30 MG/DL
RBC # BLD AUTO: 4.74 10^6/UL (ref 4.35–5.55)
SODIUM SERPL-SCNC: 140.7 MMOL/L (ref 137–145)
SP GR UR STRIP: 1.02
UROBILINOGEN UR-MCNC: NEGATIVE MG/DL (ref ?–2)
WBC # BLD AUTO: 4.7 10^3/UL (ref 4–10.5)

## 2019-07-03 PROCEDURE — 84100 ASSAY OF PHOSPHORUS: CPT

## 2019-07-03 PROCEDURE — 85027 COMPLETE CBC AUTOMATED: CPT

## 2019-07-03 PROCEDURE — 83970 ASSAY OF PARATHORMONE: CPT

## 2019-07-03 PROCEDURE — 36415 COLL VENOUS BLD VENIPUNCTURE: CPT

## 2019-07-03 PROCEDURE — 81001 URINALYSIS AUTO W/SCOPE: CPT

## 2019-07-03 PROCEDURE — 80048 BASIC METABOLIC PNL TOTAL CA: CPT

## 2019-09-19 ENCOUNTER — HOSPITAL ENCOUNTER (EMERGENCY)
Dept: HOSPITAL 62 - ER | Age: 69
Discharge: HOME | End: 2019-09-19
Payer: MEDICARE

## 2019-09-19 VITALS — DIASTOLIC BLOOD PRESSURE: 78 MMHG | SYSTOLIC BLOOD PRESSURE: 155 MMHG

## 2019-09-19 DIAGNOSIS — J44.9: ICD-10-CM

## 2019-09-19 DIAGNOSIS — R42: ICD-10-CM

## 2019-09-19 DIAGNOSIS — R53.1: ICD-10-CM

## 2019-09-19 DIAGNOSIS — I10: ICD-10-CM

## 2019-09-19 DIAGNOSIS — E11.649: Primary | ICD-10-CM

## 2019-09-19 DIAGNOSIS — Z87.891: ICD-10-CM

## 2019-09-19 LAB
ADD MANUAL DIFF: NO
ALBUMIN SERPL-MCNC: 4.2 G/DL (ref 3.5–5)
ALP SERPL-CCNC: 79 U/L (ref 38–126)
ANION GAP SERPL CALC-SCNC: 8 MMOL/L (ref 5–19)
AST SERPL-CCNC: 56 U/L (ref 17–59)
BASOPHILS # BLD AUTO: 0 10^3/UL (ref 0–0.2)
BASOPHILS NFR BLD AUTO: 0.7 % (ref 0–2)
BILIRUB DIRECT SERPL-MCNC: 0.1 MG/DL (ref 0–0.4)
BILIRUB SERPL-MCNC: 0.4 MG/DL (ref 0.2–1.3)
BUN SERPL-MCNC: 11 MG/DL (ref 7–20)
CALCIUM: 9.4 MG/DL (ref 8.4–10.2)
CHLORIDE SERPL-SCNC: 105 MMOL/L (ref 98–107)
CK MB SERPL-MCNC: 1.75 NG/ML (ref ?–4.55)
CK SERPL-CCNC: 234 U/L (ref 55–170)
CO2 SERPL-SCNC: 29 MMOL/L (ref 22–30)
EOSINOPHIL # BLD AUTO: 0.2 10^3/UL (ref 0–0.6)
EOSINOPHIL NFR BLD AUTO: 3.4 % (ref 0–6)
ERYTHROCYTE [DISTWIDTH] IN BLOOD BY AUTOMATED COUNT: 13.6 % (ref 11.5–14)
GLUCOSE SERPL-MCNC: 109 MG/DL (ref 75–110)
HCT VFR BLD CALC: 44.1 % (ref 37.9–51)
HGB BLD-MCNC: 14.8 G/DL (ref 13.5–17)
LYMPHOCYTES # BLD AUTO: 1.5 10^3/UL (ref 0.5–4.7)
LYMPHOCYTES NFR BLD AUTO: 28.2 % (ref 13–45)
MCH RBC QN AUTO: 31.2 PG (ref 27–33.4)
MCHC RBC AUTO-ENTMCNC: 33.5 G/DL (ref 32–36)
MCV RBC AUTO: 93 FL (ref 80–97)
MONOCYTES # BLD AUTO: 0.3 10^3/UL (ref 0.1–1.4)
MONOCYTES NFR BLD AUTO: 6.5 % (ref 3–13)
NEUTROPHILS # BLD AUTO: 3.3 10^3/UL (ref 1.7–8.2)
NEUTS SEG NFR BLD AUTO: 61.2 % (ref 42–78)
PLATELET # BLD: 194 10^3/UL (ref 150–450)
POTASSIUM SERPL-SCNC: 4.3 MMOL/L (ref 3.6–5)
PROT SERPL-MCNC: 7.4 G/DL (ref 6.3–8.2)
RBC # BLD AUTO: 4.74 10^6/UL (ref 4.35–5.55)
TOTAL CELLS COUNTED % (AUTO): 100 %
TROPONIN I SERPL-MCNC: < 0.012 NG/ML
WBC # BLD AUTO: 5.3 10^3/UL (ref 4–10.5)

## 2019-09-19 PROCEDURE — 36415 COLL VENOUS BLD VENIPUNCTURE: CPT

## 2019-09-19 PROCEDURE — 85025 COMPLETE CBC W/AUTO DIFF WBC: CPT

## 2019-09-19 PROCEDURE — 82553 CREATINE MB FRACTION: CPT

## 2019-09-19 PROCEDURE — 82550 ASSAY OF CK (CPK): CPT

## 2019-09-19 PROCEDURE — 82962 GLUCOSE BLOOD TEST: CPT

## 2019-09-19 PROCEDURE — 84484 ASSAY OF TROPONIN QUANT: CPT

## 2019-09-19 PROCEDURE — 80053 COMPREHEN METABOLIC PANEL: CPT

## 2019-09-19 NOTE — ER DOCUMENT REPORT
ED Medical Screen (RME)





- General


Chief Complaint: Dizziness


Stated Complaint: DIZZINESS


Time Seen by Provider: 09/19/19 13:05


Primary Care Provider: 


GURWINDER HOLLAND PA-C [Primary Care Provider] - Follow up as needed


Notes: 





Patient is a 68-year-old male who presents the emergency department with a chief

complaint of dizziness.  He is a diabetic and he states that this morning 1 of 

his glucometer is rather low and another glucometer read that his blood sugar 

was 217.  He took his Lasgolar, his diabetic medication and states that the 

dizziness is gradually subsiding.  He denies any weakness.





Exam: Strength 5 out of 5 in all extremities.   Blood glucose here in the 

emergency department is 147.





I have greeted and performed a rapid initial assessment of this patient.  A 

comprehensive ED assessment and evaluation of the patient, analysis of test 

results and completion of medical decision making process will be conducted by 

an additional ED providers.


TRAVEL OUTSIDE OF THE [place] IN LAST 30 DAYS: No





- Related Data


Allergies/Adverse Reactions: 


                                        





No Known Allergies Allergy (Verified 04/03/19 18:13)


   











Past Medical History





- Past Medical History


Cardiac Medical History: Reports: Hx Congestive Heart Failure, Hx Heart Attack, 

Hx Hypercholesterolemia, Hx Hypertension


Pulmonary Medical History: Reports: Hx COPD


Endocrine Medical History: Reports: Hx Diabetes Mellitus Type 2 - IDDM


Renal/ Medical History: Reports: Hx Kidney Stones, Hx Renal Insufficiency.  

Denies: Hx Peritoneal Dialysis


GI Medical History: Reports: Hx Gastroesophageal Reflux Disease


Psychiatric Medical History: Reports: Hx Depression


Past Surgical History: Reports: Hx Abdominal Surgery - Nissen fundoplication, Hx

Cardiac Surgery - cardioMEMS implanted, Hx Cholecystectomy, Hx Tonsillectomy





- Immunizations


Hx Diphtheria, Pertussis, Tetanus Vaccination: Yes





Physical Exam





- Vital signs


Vitals: 





                                        











Temp Pulse Resp BP Pulse Ox


 


 98.5 F   85   17   173/107 H  97 


 


 09/19/19 12:49  09/19/19 12:49  09/19/19 12:49  09/19/19 12:49  09/19/19 12:49














Course





- Vital Signs


Vital signs: 





                                        











Temp Pulse Resp BP Pulse Ox


 


 98.5 F   85   17   173/107 H  97 


 


 09/19/19 12:49  09/19/19 12:49  09/19/19 12:49  09/19/19 12:49  09/19/19 12:49














Doctor's Discharge





- Discharge


Referrals: 


GURWINDER HOLLAND PA-C [Primary Care Provider] - Follow up as needed

## 2019-09-19 NOTE — ER DOCUMENT REPORT
ED General





- General


Chief Complaint: Dizziness


Stated Complaint: DIZZINESS


Time Seen by Provider: 09/19/19 13:05


Primary Care Provider: 


GURWINDER HOLLAND PA-C [Primary Care Provider] - Follow up as needed


Mode of Arrival: Ambulatory


Information source: Patient


Notes: 





This is a 68-year-old patient with diabetes presented to the emergency 

department with complaint of feeling weak and dizzy when he woke up.  He states 

he took his glucose at home and it read low.  He states he checked it on a 

second meter and it read 217.  He reports that he had resolution of his symptoms

so he decided not to come to the hospital.  At approximately noon today he had 

another episode of dizziness and decided to come for evaluation.  He was seen by

the provider in triage who initiated work-up.  He denies any recent illness, 

chest pain, shortness of breath, nausea, vomiting or diarrhea.





TRAVEL OUTSIDE OF THE [place] IN LAST 30 DAYS: No





- Related Data


Allergies/Adverse Reactions: 


                                        





No Known Allergies Allergy (Verified 04/03/19 18:13)


   











Past Medical History





- General


Information source: Patient





- Social History


Smoking Status: Former Smoker


Frequency of alcohol use: None


Drug Abuse: None


Family History: Reviewed & Not Pertinent, DM, Hypertension


Patient has suicidal ideation: No


Patient has homicidal ideation: No





- Past Medical History


Cardiac Medical History: Reports: Hx Congestive Heart Failure, Hx Heart Attack, 

Hx Hypercholesterolemia, Hx Hypertension


Pulmonary Medical History: Reports: Hx COPD


Endocrine Medical History: Reports: Hx Diabetes Mellitus Type 2 - IDDM


Renal/ Medical History: Reports: Hx Kidney Stones, Hx Renal Insufficiency.  

Denies: Hx Peritoneal Dialysis


GI Medical History: Reports: Hx Gastroesophageal Reflux Disease


Psychiatric Medical History: Reports: Hx Depression


Past Surgical History: Reports: Hx Abdominal Surgery - Nissen fundoplication, Hx

Cardiac Surgery - cardioMEMS implanted, Hx Cholecystectomy, Hx Tonsillectomy





- Immunizations


Hx Diphtheria, Pertussis, Tetanus Vaccination: Yes


Hx Pneumococcal Vaccination: 01/01/13





Review of Systems





- Review of Systems


Constitutional: Weakness


EENT: No symptoms reported


Cardiovascular: No symptoms reported


Respiratory: No symptoms reported


Gastrointestinal: No symptoms reported


Genitourinary: No symptoms reported


Male Genitourinary: No symptoms reported


Musculoskeletal: No symptoms reported


Skin: No symptoms reported


Hematologic/Lymphatic: No symptoms reported


Neurological/Psychological: Other - Dizzy





Physical Exam





- Vital signs


Vitals: 


                                        











Temp Pulse Resp BP Pulse Ox


 


 98.5 F   85   17   173/107 H  97 


 


 09/19/19 12:49  09/19/19 12:49  09/19/19 12:49  09/19/19 12:49  09/19/19 12:49














- Notes


Notes: 





PHYSICAL EXAMINATION:





GENERAL: Well-appearing, well-nourished and in no acute distress.





HEAD: Atraumatic, normocephalic.





EYES: Pupils equal round and reactive to light, extraocular movements intact, 

sclera anicteric, conjunctiva are normal.





ENT: Nares patent, oropharynx clear without exudates.  Moist mucous membranes.





NECK: Normal range of motion, supple without lymphadenopathy





LUNGS: Breath sounds clear to auscultation bilaterally and equal.  No wheezes 

rales or rhonchi.





HEART: Regular rate and rhythm without murmurs





ABDOMEN: Soft, nontender, nondistended abdomen.  No guarding, no rebound.  No 

masses appreciated.





Musculoskeletal: Normal range of motion, no pitting or edema.  No cyanosis.





NEUROLOGICAL: Cranial nerves grossly intact.  Normal speech, normal gait.  

Normal sensory, motor exams 





PSYCH: Normal mood, normal affect.





SKIN: Warm, Dry, normal turgor, no rashes or lesions noted.





Course





- Re-evaluation


Re-evalutation: 





09/19/19 17:02


Patient allowed us to obtain blood work only, he denied an IV placement or chest

x-ray.





Patient appears well, nontoxic and vital signs are within normal limits.  He was

seen by provider in triage who initiated his work-up.  His blood glucose is in 

acceptable limits here and he has no evidence of hypo-or hyperglycemia.  

Multiple orders are placed by triage provider, patient declined all of this 

stating that he just wants his blood pressure and blood sugar checked.  He did 

agree to have labs drawn.





Lab work-up was unremarkable, glucose was 234.  Patient was allowed to eat per 

his request.  Patient encouraged to continue compliance with his medication 

regimen as prescribed by his primary care provider and have close follow-up with

them.  Patient was encouraged to return to the emergency department for any new 

or worsening symptoms or return of any episode of severe hypoglycemia.  Patient 

is agreeable to this plan and wishes to go home.








- Vital Signs


Vital signs: 


                                        











Temp Pulse Resp BP Pulse Ox


 


 98.1 F   68   16   155/78 H  98 


 


 09/19/19 17:04  09/19/19 17:04  09/19/19 17:04  09/19/19 17:04  09/19/19 17:04














- Laboratory


Result Diagrams: 


                                 09/19/19 14:45





                                 09/19/19 14:45


Laboratory results interpreted by me: 


                                        











  09/19/19 09/19/19





  13:05 14:45


 


Creatinine   1.46 H


 


Est GFR ( Amer)   58 L


 


Est GFR (MDRD) Non-Af   48 L


 


POC Glucose  142 H 


 


Creatine Kinase   234 H














Discharge





- Discharge


Clinical Impression: 


 Hypoglycemia, Dizziness





Condition: Stable


Disposition: HOME, SELF-CARE


Additional Instructions: 


You are seen in the emergency department today for complaints of dizziness after

having an episode of low blood sugar at home.  Your blood sugars were normal 

here in the emergency department on 2 separate checks.  You have stated that all

of your symptoms have resolved.  Please take all medications as prescribed.  

Please follow-up with your primary care provider regarding today's concerns.  

Return to the emergency department for any new or worsening symptoms.





Referrals: 


GURWINDER HOLLAND PA-C [Primary Care Provider] - Follow up as needed

## 2019-10-16 ENCOUNTER — HOSPITAL ENCOUNTER (OUTPATIENT)
Dept: HOSPITAL 62 - ER | Age: 69
Setting detail: OBSERVATION
LOS: 1 days | Discharge: HOME | End: 2019-10-17
Attending: INTERNAL MEDICINE | Admitting: INTERNAL MEDICINE
Payer: MEDICARE

## 2019-10-16 DIAGNOSIS — Z79.82: ICD-10-CM

## 2019-10-16 DIAGNOSIS — E11.40: ICD-10-CM

## 2019-10-16 DIAGNOSIS — N18.9: ICD-10-CM

## 2019-10-16 DIAGNOSIS — K21.9: ICD-10-CM

## 2019-10-16 DIAGNOSIS — E78.00: ICD-10-CM

## 2019-10-16 DIAGNOSIS — Z79.02: ICD-10-CM

## 2019-10-16 DIAGNOSIS — R53.1: Primary | ICD-10-CM

## 2019-10-16 DIAGNOSIS — E11.22: ICD-10-CM

## 2019-10-16 DIAGNOSIS — E66.9: ICD-10-CM

## 2019-10-16 DIAGNOSIS — I25.2: ICD-10-CM

## 2019-10-16 DIAGNOSIS — Z86.73: ICD-10-CM

## 2019-10-16 DIAGNOSIS — I42.8: ICD-10-CM

## 2019-10-16 DIAGNOSIS — I13.0: ICD-10-CM

## 2019-10-16 DIAGNOSIS — Z79.4: ICD-10-CM

## 2019-10-16 DIAGNOSIS — I50.9: ICD-10-CM

## 2019-10-16 DIAGNOSIS — Z87.891: ICD-10-CM

## 2019-10-16 DIAGNOSIS — Z83.3: ICD-10-CM

## 2019-10-16 DIAGNOSIS — Z82.49: ICD-10-CM

## 2019-10-16 DIAGNOSIS — J44.9: ICD-10-CM

## 2019-10-16 DIAGNOSIS — F32.9: ICD-10-CM

## 2019-10-16 LAB
ADD MANUAL DIFF: NO
ALBUMIN SERPL-MCNC: 3.6 G/DL (ref 3.5–5)
ALP SERPL-CCNC: 66 U/L (ref 38–126)
ANION GAP SERPL CALC-SCNC: 7 MMOL/L (ref 5–19)
APTT BLD: 28.8 SEC (ref 23.5–35.8)
AST SERPL-CCNC: 46 U/L (ref 17–59)
BASOPHILS # BLD AUTO: 0 10^3/UL (ref 0–0.2)
BASOPHILS NFR BLD AUTO: 0.3 % (ref 0–2)
BILIRUB DIRECT SERPL-MCNC: 0.1 MG/DL (ref 0–0.4)
BILIRUB SERPL-MCNC: 0.4 MG/DL (ref 0.2–1.3)
BUN SERPL-MCNC: 17 MG/DL (ref 7–20)
CALCIUM: 9.3 MG/DL (ref 8.4–10.2)
CHLORIDE SERPL-SCNC: 102 MMOL/L (ref 98–107)
CO2 SERPL-SCNC: 30 MMOL/L (ref 22–30)
EOSINOPHIL # BLD AUTO: 0.2 10^3/UL (ref 0–0.6)
EOSINOPHIL NFR BLD AUTO: 3.5 % (ref 0–6)
ERYTHROCYTE [DISTWIDTH] IN BLOOD BY AUTOMATED COUNT: 13.6 % (ref 11.5–14)
GLUCOSE SERPL-MCNC: 147 MG/DL (ref 75–110)
HCT VFR BLD CALC: 42.2 % (ref 37.9–51)
HGB BLD-MCNC: 14 G/DL (ref 13.5–17)
INR PPP: 1.13
LYMPHOCYTES # BLD AUTO: 1.4 10^3/UL (ref 0.5–4.7)
LYMPHOCYTES NFR BLD AUTO: 30.6 % (ref 13–45)
MCH RBC QN AUTO: 31 PG (ref 27–33.4)
MCHC RBC AUTO-ENTMCNC: 33.2 G/DL (ref 32–36)
MCV RBC AUTO: 94 FL (ref 80–97)
MONOCYTES # BLD AUTO: 0.3 10^3/UL (ref 0.1–1.4)
MONOCYTES NFR BLD AUTO: 5.9 % (ref 3–13)
NEUTROPHILS # BLD AUTO: 2.8 10^3/UL (ref 1.7–8.2)
NEUTS SEG NFR BLD AUTO: 59.7 % (ref 42–78)
PLATELET # BLD: 182 10^3/UL (ref 150–450)
POTASSIUM SERPL-SCNC: 3.8 MMOL/L (ref 3.6–5)
PROT SERPL-MCNC: 6.5 G/DL (ref 6.3–8.2)
PROTHROMBIN TIME: 14.5 SEC (ref 11.4–15.4)
RBC # BLD AUTO: 4.51 10^6/UL (ref 4.35–5.55)
TOTAL CELLS COUNTED % (AUTO): 100 %
WBC # BLD AUTO: 4.7 10^3/UL (ref 4–10.5)

## 2019-10-16 PROCEDURE — 70551 MRI BRAIN STEM W/O DYE: CPT

## 2019-10-16 PROCEDURE — 93880 EXTRACRANIAL BILAT STUDY: CPT

## 2019-10-16 PROCEDURE — 84484 ASSAY OF TROPONIN QUANT: CPT

## 2019-10-16 PROCEDURE — 80061 LIPID PANEL: CPT

## 2019-10-16 PROCEDURE — 85025 COMPLETE CBC W/AUTO DIFF WBC: CPT

## 2019-10-16 PROCEDURE — 99285 EMERGENCY DEPT VISIT HI MDM: CPT

## 2019-10-16 PROCEDURE — G0378 HOSPITAL OBSERVATION PER HR: HCPCS

## 2019-10-16 PROCEDURE — 82962 GLUCOSE BLOOD TEST: CPT

## 2019-10-16 PROCEDURE — 85610 PROTHROMBIN TIME: CPT

## 2019-10-16 PROCEDURE — 80053 COMPREHEN METABOLIC PANEL: CPT

## 2019-10-16 PROCEDURE — 85730 THROMBOPLASTIN TIME PARTIAL: CPT

## 2019-10-16 PROCEDURE — 82550 ASSAY OF CK (CPK): CPT

## 2019-10-16 PROCEDURE — 97163 PT EVAL HIGH COMPLEX 45 MIN: CPT

## 2019-10-16 PROCEDURE — 70450 CT HEAD/BRAIN W/O DYE: CPT

## 2019-10-16 PROCEDURE — 36415 COLL VENOUS BLD VENIPUNCTURE: CPT

## 2019-10-16 PROCEDURE — 83036 HEMOGLOBIN GLYCOSYLATED A1C: CPT

## 2019-10-16 PROCEDURE — 97165 OT EVAL LOW COMPLEX 30 MIN: CPT

## 2019-10-16 PROCEDURE — 97116 GAIT TRAINING THERAPY: CPT

## 2019-10-16 PROCEDURE — 93010 ELECTROCARDIOGRAM REPORT: CPT

## 2019-10-16 PROCEDURE — 85027 COMPLETE CBC AUTOMATED: CPT

## 2019-10-16 PROCEDURE — 93005 ELECTROCARDIOGRAM TRACING: CPT

## 2019-10-16 RX ADMIN — HEPARIN SODIUM SCH UNIT: 5000 INJECTION, SOLUTION INTRAVENOUS; SUBCUTANEOUS at 22:01

## 2019-10-16 RX ADMIN — GABAPENTIN SCH MG: 300 CAPSULE ORAL at 18:35

## 2019-10-16 RX ADMIN — INSULIN HUMAN SCH: 100 INJECTION, SOLUTION PARENTERAL at 21:55

## 2019-10-16 RX ADMIN — Medication SCH ML: at 22:02

## 2019-10-16 RX ADMIN — HYDRALAZINE HYDROCHLORIDE SCH MG: 10 TABLET, FILM COATED ORAL at 22:02

## 2019-10-16 NOTE — RADIOLOGY REPORT (SQ)
EXAM DESCRIPTION:  CT HEAD WITHOUT



COMPLETED DATE/TIME:  10/16/2019 3:38 pm



REASON FOR STUDY:  left foot weakness



COMPARISON:  3/5/2019.



TECHNIQUE:  Axial images acquired through the brain without intravenous contrast.  Images reviewed wi
th bone, brain and subdural windows.  Additional sagittal and coronal reconstructions were generated.
 Images stored on PACS.

All CT scanners at this facility use dose modulation, iterative reconstruction, and/or weight based d
osing when appropriate to reduce radiation dose to as low as reasonably achievable (ALARA).

[facility]: Dose Right  [facility]: CareDose    [facility]: Dose Right    [facility]: Teradose 4D    [facility]: Smart Technologies



RADIATION DOSE:  CT Rad equipment meets quality standard of care and radiation dose reduction techniq
ues were employed. CTDIvol: 53.2 mGy. DLP: 1150 mGy-cm.mGy.



LIMITATIONS:  None.



FINDINGS:  VENTRICLES: Prominent.

CEREBRUM: No masses.  No hemorrhage.  No midline shift.  Areas of low density in the white matter mos
t likely due to chronic micro-vascular ischemic change.  Old infarct in the right occipital lobe.  No
 evidence for acute infarction.

CEREBELLUM: No masses.  No hemorrhage.  No alteration of density.  No evidence for acute infarction.

EXTRAAXIAL SPACES: Age-related involutional change.  No fluid collections.  No masses.

ORBITS AND GLOBE: No intra- or extraconal masses.  Normal contour of globe without masses.

CALVARIUM: No fracture.

PARANASAL SINUSES: No fluid or mucosal thickening.

SOFT TISSUES: No mass or hematoma.

OTHER: No other significant finding.



IMPRESSION:  CHRONIC CHANGES OF ATROPHY AND MICROVASCULAR ISCHEMIA.  OLD INFARCT IN THE RIGHT OCCIPIT
AL LOBE.  NO ACUTE PROCESS.

EVIDENCE OF ACUTE STROKE: NO.



TECHNICAL DOCUMENTATION:  JOB ID:  [id number]

Quality ID # 436: Final reports with documentation of one or more dose reduction techniques (e.g., Au
tomated exposure control, adjustment of the mA and/or kV according to patient size, use of iterative 
reconstruction technique)

 2011 [employer]- All Rights Reserved



Reading location - IP/workstation name: [email or web address]

## 2019-10-16 NOTE — RADIOLOGY REPORT (SQ)
EXAM DESCRIPTION:  CAROTID DOPPLER



COMPLETED DATE/TIME:  10/16/2019 6:56 pm



REASON FOR STUDY:  tia



COMPARISON:  None.



TECHNIQUE:  Grayscale ultrasound, Doppler velocity and spectra, and color Doppler images acquired of 
the extra-cranial carotid and vertebral arteries. Images stored on PACS.



LIMITATIONS:  None.



FINDINGS:  RIGHT CAROTID

CCA Velocities: Within normal limits.

ICA Velocities

Peak systolic 94 cm/s.

End diastolic 35 cm/s.

Proximal ICA/CCA peak systolic ratio 2.0.

Soft plaque.  Tortuous ICA.

LEFT CAROTID

CCA Velocities: Within normal limits.

ICA Velocities

Peak systolic 63 cm/s.

End diastolic 20 sick cm/s.

Proximal ICA/CCA peak systolic ratio 1.0.

Soft plaque.  Tortuous ICA.

VERTEBRAL ARTERIES: Antegrade flow.  Normal waveforms.

SUBCLAVIAN ARTERIES: No finding.

OTHER: No other significant finding.



IMPRESSION:  NO HEMODYNAMICALLY SIGNIFICANT STENOSIS.



COMMENT:  Quality ID #195:  Velocity criteria are extrapolated from the diameter data as defined by t
[place] Society of Radiologists in Ultrasound Consensus Conference. Radiology 2003: 229; 340-346.



TECHNICAL DOCUMENTATION:  JOB ID:  [id number]

 2011 [employer]- All Rights Reserved



Reading location - IP/workstation name: [email or web address]

## 2019-10-16 NOTE — EKG REPORT
SEVERITY:- ABNORMAL ECG -

SINUS RHYTHM

PAIRED VENTRICULAR PREMATURE COMPLEXES

FIRST DEGREE AV BLOCK

NONSPECIFIC INTRAVENTRICULAR CONDUCTION DELAY

LOW VOLTAGE IN FRONTAL LEADS

:

Confirmed by: Jerardo Ulloa 16-Oct-2019 22:59:55

## 2019-10-16 NOTE — ER DOCUMENT REPORT
ED General





- General


Chief Complaint: Foot Pain


Stated Complaint: DIZZINESS


Time Seen by Provider: 10/16/19 14:40


Notes: 





Patient is a 68-year-old male who presents to the emergency department with a 

chief complaint of left foot weakness.  Patient has a history of diabetic 

neuropathy.  He states that he felt numbness in his left foot that was worse 

yesterday.  He saw his primary care provider.  They told him that it was 

diabetic neuropathy.  This morning he was trying to keep his shoe on but it kept

falling off.  He states that his left foot is weaker than the right.


TRAVEL OUTSIDE OF THE [place] IN LAST 30 DAYS: No





- Related Data


Allergies/Adverse Reactions: 


                                        





No Known Allergies Allergy (Verified 04/03/19 18:13)


   











Past Medical History





- Social History


Smoking Status: Former Smoker


Chew tobacco use (# tins/day): No


Frequency of alcohol use: None


Drug Abuse: None


Family History: Reviewed & Not Pertinent, DM, Hypertension


Patient has suicidal ideation: No


Patient has homicidal ideation: No





- Past Medical History


Cardiac Medical History: Reports: Hx Congestive Heart Failure, Hx Heart Attack, 

Hx Hypercholesterolemia, Hx Hypertension


Pulmonary Medical History: Reports: Hx COPD


Endocrine Medical History: Reports: Hx Diabetes Mellitus Type 2 - IDDM


Renal/ Medical History: Reports: Hx Kidney Stones, Hx Renal Insufficiency.  

Denies: Hx Peritoneal Dialysis


GI Medical History: Reports: Hx Gastroesophageal Reflux Disease


Psychiatric Medical History: Reports: Hx Depression


Past Surgical History: Reports: Hx Abdominal Surgery - Nissen fundoplication, Hx

Cardiac Surgery - cardioMEMS implanted, Hx Cholecystectomy, Hx Tonsillectomy





- Immunizations


Hx Diphtheria, Pertussis, Tetanus Vaccination: Yes


Hx Pneumococcal Vaccination: 01/01/13





Review of Systems





- Review of Systems


Notes: 





REVIEW OF SYSTEMS:





CONSTITUTIONAL :    Denies recent illness.  Denies recent unintentional weight 

loss.  Denies fever,  chills, or sweats. 


EENT: Denies eye, ear, throat, or mouth pain, discharge, or symptoms.  Denies 

nasal or sinus congestion.


CARDIOVASCULAR:  Denies chest pain.


RESPIRATORY: Denies shortness of breath, cough, congestion, difficulty 

breathing, or wheezing. 


GASTROINTESTINAL: Denies nausea, vomiting, and diarrhea.  Denies abdominal pain.

 Denies constipation. 


GENITOURINARY:  Denies difficulty urinating, burning, blood in urine, urgency or

frequency.


MUSCULOSKELETAL:  Denies neck and back pain.  Denies joint pain or swelling.


SKIN:   Denies rash, itchiness, or lesions


HEMATOLOGIC :   Denies easy bruising or bleeding.


LYMPHATIC:  Denies swollen, painful, enlarged glands.


NEUROLOGICAL: See HPI.


PSYCHIATRIC:  Denies stress, anxiety, alteration in sleep patterns, or 

depression.





All other systems reviewed and negative.





Physical Exam





- Vital signs


Vitals: 


                                        











Pulse Resp BP Pulse Ox


 


 74   18   149/76 H  95 


 


 10/16/19 14:30  10/16/19 14:30  10/16/19 14:30  10/16/19 14:30














- Notes


Notes: 





PHYSICAL EXAMINATION:





GENERAL: Appears well, healthy, well-nourished, no acute distress. 





HEAD:  Normocephalic, atraumatic.





EYES:  PERRL, conjunctiva normal, all extraocular movements intact, sclera 

nonicteric





ENT:  Moist mucous membranes. 





NECK: Supple, no noticeable swelling, redness, rash.  Normal range of motion.





LUNGS: Equal breath sounds bilaterally and clear to auscultation.  No wheezes 

rales or rhonchi.





CARDIOVASCULAR: S1-S2, regular rate, regular rhythm.  Radial pulses 2+, normal.





ABDOMEN: Normoactive bowel sounds.  Soft, nontender,  no guarding, no rebound 

tenderness, and no masses palpated.





EXTREMITIES: Right foot strength greater than left.  5/5 strength in upper 

extremities.





NEUROLOGICAL: Moves all extremities upon command.  





PSYCH: Normal mood, normal affect.





SKIN: Warm, dry.  No rash, lesions, ulcerations noted.  Normal skin turgor.





Course





- Re-evaluation


Re-evalutation: 





10/16/19 16:35


CBC chemistry are unremarkable at this time.  Patient has old infarcts noted on 

his CT.  He reported these results to the patient.  He asked me to call his 

primary care provider to run his information by him before calling hospitalist.


10/16/19 16:40


I spoke with Dr. Arevalo, patient's primary care provider.  He is in agreement on

possibly having the patient observed overnight to see if his symptoms resolve.


10/16/19 17:04


I spoke with Dr. Dc and the patient will be admitted to [facility].











- Vital Signs


Vital signs: 


                                        











Temp Pulse Resp BP Pulse Ox


 


 97.4 F   72   18   135/75 H  97 


 


 10/17/19 00:00  10/17/19 00:00  10/17/19 00:00  10/17/19 00:00  10/17/19 00:00














- Laboratory


Result Diagrams: 


                                 10/16/19 15:25





                                 10/16/19 15:25


Laboratory results interpreted by me: 


                                        











  10/16/19





  15:25


 


Creatinine  1.56 H


 


Est GFR ( Amer)  54 L


 


Est GFR (MDRD) Non-Af  44 L


 


Glucose  147 H














Discharge





- Discharge


Clinical Impression: 


 Weakness of left foot





Condition: Fair


Disposition: ADMITTED AS INPATIENT


Admitting Provider: Dao (Hospitalist)


Unit Admitted: [facility]

## 2019-10-16 NOTE — PDOC H&P
History of Present Illness


Admission Date/PCP: 


  10/16/19 17:21





  NIKI GRANT MD





Patient complains of: Numbness and tingling in the left foot.


History of Present Illness: 


KENNY GUEVARA is a 68 year old male with history of hypertension, type 2 diabetes 

mellitus, congestive heart failure, density, previous history of strokes, 

history of MI came to the emergency room with concerns about left leg weakness 

numbness from yesterday.  He is dragging his left side of the body from this 

morning yesterday.  Concerned about these changes and physical condition called 

his primary care physician and he was advised to come to the hospital for 

further evaluation.  Patient complaining of no headaches no dizzy spells no 

chest pains no falls no urinary incontinence no fecal incontinence.  Denies any 

recent trauma.  CT head in the emergency room was negative for acute pathology. 

Medical consult was called for admission.  Patient states he has a CardioMEMS 

heart monitor we are not sure if it is MRI compatible or not.





Past Medical History


Cardiac Medical History: Reports: Congestive Heart Failure, Myocardial 

Infarction, Hyperlipidema, Hypertension


Pulmonary Medical History: Reports: Chronic Obstructive Pulmonary Disease (COPD)


Endocrine Medical History: Reports: Diabetes Mellitus Type 2 - IDDM


GI Medical History: Reports: Gastroesophageal Reflux Disease


Psychiatric Medical History: Reports: None, Depression





Past Surgical History


Past Surgical History: Reports: Cholecystectomy, Tonsillectomy, Other - 

CardioMEMS heart monitor





Social History


Smoking Status: Former Smoker


Electronic Cigarette use?: No


Frequency of Alcohol Use: None


Hx Recreational Drug Use: No


Drugs: None


Hx Prescription Drug Abuse: No





- Advance Directive


Resuscitation Status: Full Code





Family History


Family History: Reviewed & Not Pertinent, DM, Hypertension


Parental Family History Reviewed: Yes - Hypertension, diabetes, heart disease


Children Family History Reviewed: Yes


Sibling(s) Family History Reviewed.: Yes





Medication/Allergy


Home Medications: 








Aspirin [Ecotrin 81 mg EC Tablet] 81 mg PO DAILY 03/05/19 


Atorvastatin Calcium [Lipitor 80 mg Tablet] 80 mg PO QHS 03/05/19 


Clonazepam [Klonopin 1 mg Tablet] 1 mg PO BID 03/05/19 


Clopidogrel Bisulfate [Plavix 75 mg Tablet] 75 mg PO DAILY 03/05/19 


Cyanocobalamin (Vitamin B-12) [B-12] 2,500 mcg SL DAILY 03/05/19 


Docusate Sodium [Colace 100 mg Capsule] 100 mg PO DAILY 03/05/19 


Ferrous Sulfate [Feosol 325 mg Tablet] 325 mg PO DAILY 03/05/19 


Furosemide [Lasix 20 mg Tablet] 20 mg PO BID 03/05/19 


Gabapentin [Neurontin 300 mg Capsule] 300 mg PO QHS 03/05/19 


Hydralazine HCl [Apresoline 10 mg Tablet] 10 mg PO Q12 03/05/19 


Insulin Aspart [Novolog Insulin 100 Unit/1 ml 10 ml] 5 unit SUBCUT AC 03/05/19 


Insulin Glargine,Hum.rec.anlog [Basaglar Kwikpen U-100] 30 unit SQ Q12 03/05/19 


Isosorbide Mononitrate [Imdur 30 mg Tablet.er] 30 mg PO DAILY 03/05/19 


Ivabradine HCl [Corlanor] 7.5 mg PO BID 03/05/19 


Lansoprazole [Prevacid 30 Mg Odt Tablet] 30 mg PO Q6AM 03/05/19 


Linagliptin [Tradjenta] 5 mg PO DAILY 03/05/19 


Magnesium Oxide [Mag-Ox 400 mg Tablet] 400 mg PO BID 03/05/19 


Metoprolol Succinate [Toprol  mg Tablet] 100 mg PO Q12 03/05/19 


Multivitamin [Tab-A-Shannan (Multiple Vitamin) Tablet] 1 tab PO DAILY 03/05/19 


Nortriptyline HCl [Pamelor] 75 mg PO QHS 03/05/19 


Prazosin HCl [Minipress] 5 mg PO QHS 03/05/19 


Sertraline HCl [Zoloft] 200 mg PO QHS 03/05/19 


Spironolactone [Aldactone] 50 mg PO DAILY 03/05/19 


Trazodone HCl [Desyrel] 100 mg PO QHS 03/05/19 








Allergies/Adverse Reactions: 


                                        





No Known Allergies Allergy (Verified 04/03/19 18:13)


   











Review of Systems


Constitutional: PRESENT: fatigue, weakness.  ABSENT: fever(s), headache(s)


Eyes: ABSENT: visual disturbances


Ears: ABSENT: hearing changes


Nose, Mouth, and Throat: ABSENT: sore throat


Cardiovascular: ABSENT: palpitations


Respiratory: ABSENT: cough, hemoptysis


Genitourinary: ABSENT: dysuria, hematuria


Neurological: ABSENT: abnormal gait, abnormal speech, confusion, dizziness, 

focal weakness, syncope


Psychiatric: ABSENT: anxiety, depression, homidical ideation, suicidal ideation


Endocrine: ABSENT: cold intolerance, heat intolerance, polydipsia, polyuria





Physical Exam


Vital Signs: 


                                        











Temp Pulse Resp BP Pulse Ox


 


 98.2 F   74   18   144/74 H  96 


 


 10/16/19 14:34  10/16/19 14:34  10/16/19 14:34  10/16/19 15:01  10/16/19 15:31








                                 Intake & Output











 10/15/19 10/16/19 10/17/19





 06:59 06:59 06:59


 


Weight   122.016 kg











General appearance: PRESENT: no acute distress, obese


Head exam: PRESENT: atraumatic


Eye exam: PRESENT: PERRLA


Mouth exam: PRESENT: moist, tongue midline


Teeth exam: PRESENT: poor dentation


Neck exam: ABSENT: carotid bruit, JVD, lymphadenopathy, thyromegaly


Respiratory exam: PRESENT: decreased breath sounds


Cardiovascular exam: PRESENT: RRR.  ABSENT: diastolic murmur, rubs, systolic 

murmur


GI/Abdominal exam: PRESENT: normal bowel sounds, soft.  ABSENT: distended, 

guarding, mass, organolmegaly, rebound, tenderness


Rectal exam: PRESENT: deferred


Neurological exam: PRESENT: alert, awake, oriented to person, oriented to place,

 oriented to time, oriented to situation, CN II-XII grossly intact.  ABSENT: 

motor sensory deficit


Psychiatric exam: PRESENT: appropriate affect, normal mood.  ABSENT: homicidal 

ideation, suicidal ideation





Results


Laboratory Results: 


                                        





                                 10/16/19 15:25 





                                 10/16/19 15:25 





                                        











  10/16/19 10/16/19





  15:25 15:25


 


WBC  4.7 


 


RBC  4.51 


 


Hgb  14.0 


 


Hct  42.2 


 


MCV  94 


 


MCH  31.0 


 


MCHC  33.2 


 


RDW  13.6 


 


Plt Count  182 


 


Seg Neutrophils %  59.7 


 


Sodium   139.4


 


Potassium   3.8


 


Chloride   102


 


Carbon Dioxide   30


 


Anion Gap   7


 


BUN   17


 


Creatinine   1.56 H


 


Est GFR (African Amer)   54 L


 


Glucose   147 H


 


Calcium   9.3


 


Total Bilirubin   0.4


 


AST   46


 


Alkaline Phosphatase   66


 


Total Protein   6.5


 


Albumin   3.6











Impressions: 


                                        





Head CT  10/16/19 14:58


IMPRESSION:  CHRONIC CHANGES OF ATROPHY AND MICROVASCULAR ISCHEMIA.  OLD INFARCT

 IN THE RIGHT OCCIPITAL LOBE.  NO ACUTE PROCESS.


EVIDENCE OF ACUTE STROKE: NO.


 














Assessment and Plan





- Diagnosis


(1) Weakness of left foot


Is this a current diagnosis for this admission?: Yes   


Plan: 


10/16/2019-patient is going to be admitted to [facility] for observation.  If possible

 we will do the MRI of the head tomorrow without contrast if not will repeat the

 CT head tomorrow.  Carotid Doppler will be requested tomorrow.  To start him on

 a diabetic diet and check insulin before meals and at bedtime.  GI prophylaxis 

DVT prophylaxis will be initiated stroke core measures will be implemented.  

Fall precautions will be requested.  Physical therapy consult will be requested.








(2) Diabetes mellitus


Qualifiers: 


   Diabetes mellitus type: type 2   Diabetes mellitus complication status: with 

circulatory complication   Diabetes mellitus complication detail: with other 

circulatory complications 


Is this a current diagnosis for this admission?: No   


Plan: 


10/16/2019 patient has history of type 2 diabetes mellitus to start him on 

insulin sliding scale before meals and at bedtime  and check hemoglobin A1c 

tomorrow diet exercise weight loss lifestyle modifications will be discussed 

with the patient.








(3) HTN (hypertension)


Qualifiers: 


   Hypertension type: essential hypertension   Qualified Code(s): I10 - 

Essential (primary) hypertension   


Is this a current diagnosis for this admission?: No   


Plan: 


10/16/2019-patient blood pressure today is 144/80.  Plan is to continue his home

 medications at the time of admission.








(4) Non-ischemic cardiomyopathy


Is this a current diagnosis for this admission?: No   


Plan: 


10/16/2019-patient has history of congestive heart failure not in fluid overload

 at the time of admission.  Recent echocardiogram done in March this year shows 

EF of 60% and mild diastolic heart failure.  Patient said he has a CardioMEMS 

heart monitor device.








(5) CKD (chronic kidney disease)


Is this a current diagnosis for this admission?: No   


Plan: 


10/16/2019-patient has history of chronic kidney disease.  Creatinine today is 

1.56.  Stable.  Patient has a stage III kidney disease.








- Time


Time Spent with patient: 35 or more minutes


Medications reviewed and adjusted accordingly: Yes


Anticipated discharge: Home with Homehealth

## 2019-10-17 VITALS — DIASTOLIC BLOOD PRESSURE: 91 MMHG | SYSTOLIC BLOOD PRESSURE: 146 MMHG

## 2019-10-17 LAB
ALBUMIN SERPL-MCNC: 3.5 G/DL (ref 3.5–5)
ALP SERPL-CCNC: 74 U/L (ref 38–126)
ANION GAP SERPL CALC-SCNC: 8 MMOL/L (ref 5–19)
AST SERPL-CCNC: 47 U/L (ref 17–59)
BILIRUB DIRECT SERPL-MCNC: 0.2 MG/DL (ref 0–0.4)
BILIRUB SERPL-MCNC: 0.4 MG/DL (ref 0.2–1.3)
BUN SERPL-MCNC: 17 MG/DL (ref 7–20)
CALCIUM: 9.1 MG/DL (ref 8.4–10.2)
CHLORIDE SERPL-SCNC: 101 MMOL/L (ref 98–107)
CHOLEST SERPL-MCNC: 90.23 MG/DL (ref 0–200)
CK SERPL-CCNC: 91 U/L (ref 55–170)
CO2 SERPL-SCNC: 32 MMOL/L (ref 22–30)
ERYTHROCYTE [DISTWIDTH] IN BLOOD BY AUTOMATED COUNT: 13.7 % (ref 11.5–14)
GLUCOSE SERPL-MCNC: 148 MG/DL (ref 75–110)
HCT VFR BLD CALC: 43.6 % (ref 37.9–51)
HGB BLD-MCNC: 14.5 G/DL (ref 13.5–17)
LDLC SERPL DIRECT ASSAY-MCNC: 48 MG/DL (ref ?–100)
MCH RBC QN AUTO: 31.3 PG (ref 27–33.4)
MCHC RBC AUTO-ENTMCNC: 33.2 G/DL (ref 32–36)
MCV RBC AUTO: 94 FL (ref 80–97)
PLATELET # BLD: 190 10^3/UL (ref 150–450)
POTASSIUM SERPL-SCNC: 3.9 MMOL/L (ref 3.6–5)
PROT SERPL-MCNC: 6.4 G/DL (ref 6.3–8.2)
RBC # BLD AUTO: 4.64 10^6/UL (ref 4.35–5.55)
TRIGL SERPL-MCNC: 185 MG/DL (ref ?–150)
VLDLC SERPL CALC-MCNC: 37 MG/DL (ref 10–31)
WBC # BLD AUTO: 5.6 10^3/UL (ref 4–10.5)

## 2019-10-17 RX ADMIN — GABAPENTIN SCH MG: 300 CAPSULE ORAL at 10:24

## 2019-10-17 RX ADMIN — INSULIN HUMAN SCH UNIT: 100 INJECTION, SOLUTION PARENTERAL at 08:37

## 2019-10-17 RX ADMIN — Medication SCH ML: at 06:29

## 2019-10-17 RX ADMIN — HYDRALAZINE HYDROCHLORIDE SCH MG: 10 TABLET, FILM COATED ORAL at 10:23

## 2019-10-17 RX ADMIN — INSULIN HUMAN SCH: 100 INJECTION, SOLUTION PARENTERAL at 12:39

## 2019-10-17 RX ADMIN — HEPARIN SODIUM SCH UNIT: 5000 INJECTION, SOLUTION INTRAVENOUS; SUBCUTANEOUS at 06:28

## 2019-10-17 RX ADMIN — HEPARIN SODIUM SCH: 5000 INJECTION, SOLUTION INTRAVENOUS; SUBCUTANEOUS at 13:19

## 2019-10-17 RX ADMIN — Medication SCH: at 13:20

## 2019-10-17 NOTE — PDOC DISCHARGE SUMMARY
Impression





- Admit/DC Date/PCP


Admission Date/Primary Care Provider: 


  10/16/19 17:21





  NIKI GRANT MD





Discharge Date: 10/17/19





- Discharge Diagnosis


(1) Weakness of left foot


Is this a current diagnosis for this admission?: Yes   





(2) Diabetes mellitus


Is this a current diagnosis for this admission?: No   





(3) HTN (hypertension)


Is this a current diagnosis for this admission?: No   





(4) Non-ischemic cardiomyopathy


Is this a current diagnosis for this admission?: No   





(5) CKD (chronic kidney disease)


Is this a current diagnosis for this admission?: No   





- Assessment


Summary: 


68 year old male with history of hypertension, type 2 diabetes mellitus, 

congestive heart failure, density, previous history of strokes, history of MI 

came to the emergency room with concerns about left leg weakness numbness from y

esterday.  He is dragging his left side of the body from this morning yesterday.

 Concerned about these changes and physical condition called his primary care 

physician and he was advised to come to the hospital for further evaluation.  

Patient complaining of no headaches no dizzy spells no chest pains no falls no 

urinary incontinence no fecal incontinence.  Denies any recent trauma.  CT head 

in the emergency room was negative for acute pathology.  Medical consult was 

called for admission.  Patient states he has a CardioMEMS heart monitor we are 

not sure if it is MRI compatible or not.








10/17/[id number]-old male with history of MI, diabetes mellitus hypertension 

hypercholesterolemia admitted for left foot weakness/tingling CT head was 

negative for acute pathology, carotid Doppler is negative for hemodynamically 

significant stenosis and MRI of the brain is negative for acute pathology.  

Patient is going home today.





- Additional Information


Resuscitation Status: Full Code


Discharge Diet: Diabetic


Discharge Activity: Activity As Tolerated


Referrals: 


GURWINDER HOLLAND PA-C [ALLIED HEALTH PROFESSIONAL] - Follow up as needed


Home Medications: 








Aspirin [Ecotrin 81 mg EC Tablet] 81 mg PO DAILY 03/05/19 


Atorvastatin Calcium [Lipitor 80 mg Tablet] 80 mg PO QHS 03/05/19 


Clonazepam [Klonopin 1 mg Tablet] 1 mg PO BID 03/05/19 


Clopidogrel Bisulfate [Plavix 75 mg Tablet] 75 mg PO DAILY 03/05/19 


Cyanocobalamin (Vitamin B-12) [B-12] 2,500 mcg SL DAILY 03/05/19 


Docusate Sodium [Colace 100 mg Capsule] 100 mg PO DAILY 03/05/19 


Ferrous Sulfate [Feosol 325 mg Tablet] 325 mg PO DAILY 03/05/19 


Furosemide [Lasix 20 mg Tablet] 20 mg PO BID 03/05/19 


Gabapentin [Neurontin 300 mg Capsule] 300 mg PO QHS 03/05/19 


Hydralazine HCl [Apresoline 10 mg Tablet] 10 mg PO Q12 03/05/19 


Insulin Aspart [Novolog Insulin (Aspart) 100 unit/mL] 5 unit SUBCUT AC 03/05/19 


Insulin Glargine,Hum.rec.anlog [Basaglar Kwikpen U-100] 30 unit SQ Q12 03/05/19 


Isosorbide Mononitrate [Imdur 30 mg Tablet.er] 30 mg PO DAILY 03/05/19 


Ivabradine HCl [Corlanor] 7.5 mg PO BID 03/05/19 


Lansoprazole [Prevacid 30 mg Odt Tablet] 30 mg PO Q6AM 03/05/19 


Linagliptin [Tradjenta] 5 mg PO DAILY 03/05/19 


Magnesium Oxide [Mag-Ox 400 mg Tablet] 400 mg PO BID 03/05/19 


Multivitamin [Tab-A-Shannan (Multiple Vitamin) Tablet] 1 tab PO DAILY 03/05/19 


Nortriptyline HCl [Pamelor] 75 mg PO QHS 03/05/19 


Prazosin HCl [Minipress] 5 mg PO QHS 03/05/19 


Sertraline HCl [Zoloft] 200 mg PO QHS 03/05/19 


Spironolactone [Aldactone] 50 mg PO DAILY 03/05/19 


Trazodone HCl [Desyrel] 100 mg PO QHS 03/05/19 


Ketoconazole [Nizoral] 1 applic TOP BID 10/16/19 


Metoprolol Succinate [Toprol Xl 50 mg Tab.sr] 50 mg PO DAILY 10/16/19 


Trazodone HCl [Desyrel] 100 mg PO QHS 10/16/19 


Umeclidinium Bromide [Incruse Ellipta] 1 puff IH DAILY 10/16/19 


Sertraline HCl [Zoloft 50 mg Tablet] 200 mg PO DAILY  tablet 10/17/19 


Spironolactone [Aldactone 25 mg Tablet] 50 mg PO DAILY  tablet 10/17/19 











History of Present Illiness


History of Present Illness: 


KENNY GUEVARA is a 68 year old male with history of hypertension, type 2 diabetes 

mellitus, congestive heart failure, density, previous history of strokes, 

history of MI came to the emergency room with concerns about left leg weakness 

numbness from yesterday.  He is dragging his left side of the body from this 

morning yesterday.  Concerned about these changes and physical condition called 

his primary care physician and he was advised to come to the hospital for 

further evaluation.  Patient complaining of no headaches no dizzy spells no 

chest pains no falls no urinary incontinence no fecal incontinence.  Denies any 

recent trauma.  CT head in the emergency room was negative for acute pathology. 

Medical consult was called for admission.  Patient states he has a CardioMEMS 

heart monitor we are not sure if it is MRI compatible or not.





Hospital Course


Hospital Course: 


68 year old male with history of hypertension, type 2 diabetes mellitus, 

congestive heart failure, density, previous history of strokes, history of MI 

came to the emergency room with concerns about left leg weakness numbness from 

yesterday.  He is dragging his left side of the body from this morning 

yesterday.  Concerned about these changes and physical condition called his 

primary care physician and he was advised to come to the hospital for further 

evaluation.  Patient complaining of no headaches no dizzy spells no chest pains 

no falls no urinary incontinence no fecal incontinence.  Denies any recent 

trauma.  CT head in the emergency room was negative for acute pathology.  

Medical consult was called for admission.  Patient states he has a CardioMEMS 

heart monitor we are not sure if it is MRI compatible or not.








10/17/2019-no acute events in the last 24 hours.  Afebrile.  CT head is negative

MRI of the brain is negative for acute pathology.  Patient is going home today.





Physical Exam


Vital Signs: 


                                        











Temp Pulse Resp BP Pulse Ox


 


 98.1 F   79   18   146/94 H  95 


 


 10/17/19 08:08  10/17/19 08:08  10/17/19 08:08  10/17/19 08:08  10/17/19 08:08








                                 Intake & Output











 10/16/19 10/17/19 10/18/19





 06:59 06:59 06:59


 


Intake Total  378 


 


Output Total  625 


 


Balance  -247 


 


Weight  117.9 kg 











General appearance: PRESENT: no acute distress, morbidly obese


Head exam: PRESENT: atraumatic


Eye exam: PRESENT: PERRLA


Mouth exam: PRESENT: moist, tongue midline


Teeth exam: PRESENT: poor dentation


Neck exam: ABSENT: carotid bruit, JVD, lymphadenopathy, thyromegaly


Respiratory exam: PRESENT: decreased breath sounds


Cardiovascular exam: PRESENT: RRR.  ABSENT: diastolic murmur, rubs, systolic 

murmur


GI/Abdominal exam: PRESENT: normal bowel sounds, soft.  ABSENT: distended, 

guarding, mass, organolmegaly, rebound, tenderness


Rectal exam: PRESENT: deferred


Extremities exam: PRESENT: full ROM.  ABSENT: calf tenderness, clubbing, pedal 

edema


Neurological exam: PRESENT: alert, awake, oriented to person, oriented to place,

oriented to time, oriented to situation, CN II-XII grossly intact.  ABSENT: 

motor sensory deficit


Psychiatric exam: PRESENT: appropriate affect, normal mood.  ABSENT: homicidal 

ideation, suicidal ideation





Results


Laboratory Results: 


                                        











WBC  5.6 10^3/uL (4.0-10.5)   10/17/19  04:18    


 


RBC  4.64 10^6/uL (4.35-5.55)   10/17/19  04:18    


 


Hgb  14.5 g/dL (13.5-17.0)   10/17/19  04:18    


 


Hct  43.6 % (37.9-51.0)   10/17/19  04:18    


 


MCV  94 fl (80-97)   10/17/19  04:18    


 


MCH  31.3 pg (27.0-33.4)   10/17/19  04:18    


 


MCHC  33.2 g/dL (32.0-36.0)   10/17/19  04:18    


 


RDW  13.7 % (11.5-14.0)   10/17/19  04:18    


 


Plt Count  190 10^3/uL (150-450)   10/17/19  04:18    


 


Lymph % (Auto)  30.6 % (13-45)   10/16/19  15:25    


 


Mono % (Auto)  5.9 % (3-13)   10/16/19  15:25    


 


Eos % (Auto)  3.5 % (0-6)   10/16/19  15:25    


 


Baso % (Auto)  0.3 % (0-2)   10/16/19  15:25    


 


Absolute Neuts (auto)  2.8 10^3/uL (1.7-8.2)   10/16/19  15:25    


 


Absolute Lymphs (auto)  1.4 10^3/uL (0.5-4.7)   10/16/19  15:25    


 


Absolute Monos (auto)  0.3 10^3/uL (0.1-1.4)   10/16/19  15:25    


 


Absolute Eos (auto)  0.2 10^3/uL (0.0-0.6)   10/16/19  15:25    


 


Absolute Basos (auto)  0.0 10^3/uL (0.0-0.2)   10/16/19  15:25    


 


Seg Neutrophils %  59.7 % (42-78)   10/16/19  15:25    


 


PT  14.5 SEC (11.4-15.4)   10/16/19  15:25    


 


INR  1.13   10/16/19  15:25    


 


APTT  28.8 SEC (23.5-35.8)   10/16/19  15:25    


 


Sodium  140.5 mmol/L (137-145)   10/17/19  04:18    


 


Potassium  3.9 mmol/L (3.6-5.0)   10/17/19  04:18    


 


Chloride  101 mmol/L ()   10/17/19  04:18    


 


Carbon Dioxide  32 mmol/L (22-30)  H  10/17/19  04:18    


 


Anion Gap  8  (5-19)   10/17/19  04:18    


 


BUN  17 mg/dL (7-20)   10/17/19  04:18    


 


Creatinine  1.65 mg/dL (0.52-1.25)  H  10/17/19  04:18    


 


Est GFR ( Amer)  50  (>60)  L  10/17/19  04:18    


 


Est GFR (MDRD) Non-Af  42  (>60)  L  10/17/19  04:18    


 


Glucose  148 mg/dL ()  H  10/17/19  04:18    


 


POC Glucose  113 mg/dL ()  H  10/17/19  11:20    


 


Hemoglobin A1c %  6.8 % (4.7-6.0)  H  10/17/19  04:18    


 


Calcium  9.1 mg/dL (8.4-10.2)   10/17/19  04:18    


 


Total Bilirubin  0.4 mg/dL (0.2-1.3)   10/17/19  04:18    


 


Direct Bilirubin  0.2 mg/dL (0.0-0.4)   10/17/19  04:18    


 


Neonat Total Bilirubin  Not Reportable   10/17/19  04:18    


 


Neonat Direct Bilirubin  Not Reportable   10/17/19  04:18    


 


Neonat Indirect Bili  Not Reportable   10/17/19  04:18    


 


AST  47 U/L (17-59)   10/17/19  04:18    


 


ALT  29 U/L (<50)   10/17/19  04:18    


 


Alkaline Phosphatase  74 U/L ()   10/17/19  04:18    


 


Creatine Kinase  91 U/L ()   10/17/19  04:18    


 


Troponin I  0.012 ng/mL  10/17/19  04:18    


 


Total Protein  6.4 g/dL (6.3-8.2)   10/17/19  04:18    


 


Albumin  3.5 g/dL (3.5-5.0)   10/17/19  04:18    


 


Triglycerides  185 mg/dL (<150)  H  10/17/19  04:18    


 


Cholesterol  90.23 mg/dL (0-200)   10/17/19  04:18    


 


LDL Cholesterol Direct  48 mg/dL (<100)   10/17/19  04:18    


 


VLDL Cholesterol  37.0 mg/dL (10-31)  H  10/17/19  04:18    


 


HDL Cholesterol  30 mg/dL (>40)  L  10/17/19  04:18    








                                        











  10/16/19 10/17/19 10/17/19





  18:25 00:56 04:18


 


Troponin I  < 0.012  < 0.012  0.012











Impressions: 


                                        





Carotid Doppler Study  10/16/19 00:00


IMPRESSION:  NO HEMODYNAMICALLY SIGNIFICANT STENOSIS.


 








Head CT  10/16/19 14:58


IMPRESSION:  CHRONIC CHANGES OF ATROPHY AND MICROVASCULAR ISCHEMIA.  OLD INFARCT

IN THE RIGHT OCCIPITAL LOBE.  NO ACUTE PROCESS.


EVIDENCE OF ACUTE STROKE: NO.


 








Head MRI  10/17/19 00:00


IMPRESSION:  ATROPHY AND CHRONIC MICRO-VASCULAR ISCHEMIC CHANGES.  OLD INFARCTS 

IN THE RIGHT OCCIPITAL LOBE AND RIGHT CEREBELLUM.  NO ACUTE FINDINGS.


EVIDENCE OF ACUTE STROKE: NO.


 














Plan


Time Spent: Greater than 30 Minutes





Stroke


Is this a Stroke Patient?: No





Acute Heart Failure





- **


Is this a Heart Failure Patient?: No

## 2019-10-17 NOTE — RADIOLOGY REPORT (SQ)
EXAM DESCRIPTION:  MRI HEAD WITHOUT



COMPLETED DATE/TIME:  10/17/2019 9:41 am



REASON FOR STUDY:  tia



COMPARISON:  10/16/2019.



TECHNIQUE:  Multiplanar imaging includes non-contrasted T1, T2, FLAIR, and diffusion with ADC map seq
uences.  Images stored on PACS.



LIMITATIONS:  None.



FINDINGS:  ANATOMY: No anomalies. Normal vascular flow voids. Pituitary fossa normal.

CSF SPACES: Atrophy induced prominence of ventricles and CSF spaces.

CEREBRUM: High signal intensity lesions scattered throughout the white matter on FLAIR imaging with d
istribution suggesting micro-vascular ischemic changes.  Old infarct in the right occipital lobe.  No
 evidence of hemorrhage, mass, or extraaxial fluid collection.

POSTERIOR FOSSA: Old infarct in the right cerebellum. No hemorrhage. No edema, masses or mass effect.
  Internal auditory canals, cerebello-pontine angles, mastoids normal.

DIFFUSION IMAGING: Negative for acute or sub-acute infarction.

ORBITS: No masses. Globes normal.

PARANASAL  SINUSES: No fluid levels.  Mucosa normal.

OTHER: No other significant finding.



IMPRESSION:  ATROPHY AND CHRONIC MICRO-VASCULAR ISCHEMIC CHANGES.  OLD INFARCTS IN THE RIGHT OCCIPITA
L LOBE AND RIGHT CEREBELLUM.  NO ACUTE FINDINGS.

EVIDENCE OF ACUTE STROKE: NO.



TECHNICAL DOCUMENTATION:  JOB ID:  [id number]

 2011 [employer]- All Rights Reserved



Reading location - IP/workstation name: [email or web address]

## 2020-02-06 ENCOUNTER — HOSPITAL ENCOUNTER (OUTPATIENT)
Dept: HOSPITAL 62 - OD | Age: 70
End: 2020-02-06
Attending: PHYSICIAN ASSISTANT
Payer: MEDICARE

## 2020-02-06 DIAGNOSIS — N18.3: ICD-10-CM

## 2020-02-06 DIAGNOSIS — N25.0: ICD-10-CM

## 2020-02-06 DIAGNOSIS — R80.9: ICD-10-CM

## 2020-02-06 DIAGNOSIS — I12.9: Primary | ICD-10-CM

## 2020-02-06 DIAGNOSIS — E11.22: ICD-10-CM

## 2020-02-06 LAB
ANION GAP SERPL CALC-SCNC: 10 MMOL/L (ref 5–19)
APPEARANCE UR: CLEAR
APTT PPP: YELLOW S
BILIRUB UR QL STRIP: NEGATIVE
BUN SERPL-MCNC: 16 MG/DL (ref 7–20)
CALCIUM: 8.9 MG/DL (ref 8.4–10.2)
CHLORIDE SERPL-SCNC: 103 MMOL/L (ref 98–107)
CO2 SERPL-SCNC: 27 MMOL/L (ref 22–30)
CREAT UR-MCNC: 286.2 MG/DL (ref 22–328)
ERYTHROCYTE [DISTWIDTH] IN BLOOD BY AUTOMATED COUNT: 13 % (ref 11.5–14)
GLUCOSE SERPL-MCNC: 153 MG/DL (ref 75–110)
GLUCOSE UR STRIP-MCNC: NEGATIVE MG/DL
HCT VFR BLD CALC: 43.2 % (ref 37.9–51)
HGB BLD-MCNC: 14.5 G/DL (ref 13.5–17)
KETONES UR STRIP-MCNC: NEGATIVE MG/DL
MCH RBC QN AUTO: 31.2 PG (ref 27–33.4)
MCHC RBC AUTO-ENTMCNC: 33.5 G/DL (ref 32–36)
MCV RBC AUTO: 93 FL (ref 80–97)
NITRITE UR QL STRIP: NEGATIVE
PH UR STRIP: 6 [PH] (ref 5–9)
PHOSPHATE SERPL-MCNC: 2.7 MG/DL (ref 2.5–4.5)
PLATELET # BLD: 190 10^3/UL (ref 150–450)
POTASSIUM SERPL-SCNC: 4.2 MMOL/L (ref 3.6–5)
PROT UR STRIP-MCNC: 100 MG/DL
PROT UR STRIP-MCNC: 33.9 MG/DL (ref ?–12)
RBC # BLD AUTO: 4.64 10^6/UL (ref 4.35–5.55)
SP GR UR STRIP: 1.02
UR PRO/CREAT RATIO RESULT: 0.1 MG/MG (ref 0–0.2)
UROBILINOGEN UR-MCNC: NEGATIVE MG/DL (ref ?–2)
WBC # BLD AUTO: 4.7 10^3/UL (ref 4–10.5)

## 2020-02-06 PROCEDURE — 85027 COMPLETE CBC AUTOMATED: CPT

## 2020-02-06 PROCEDURE — 83970 ASSAY OF PARATHORMONE: CPT

## 2020-02-06 PROCEDURE — 84100 ASSAY OF PHOSPHORUS: CPT

## 2020-02-06 PROCEDURE — 84156 ASSAY OF PROTEIN URINE: CPT

## 2020-02-06 PROCEDURE — 36415 COLL VENOUS BLD VENIPUNCTURE: CPT

## 2020-02-06 PROCEDURE — 82570 ASSAY OF URINE CREATININE: CPT

## 2020-02-06 PROCEDURE — 80048 BASIC METABOLIC PNL TOTAL CA: CPT

## 2020-02-06 PROCEDURE — 81001 URINALYSIS AUTO W/SCOPE: CPT

## 2020-05-06 ENCOUNTER — HOSPITAL ENCOUNTER (OUTPATIENT)
Dept: HOSPITAL 62 - OD | Age: 70
End: 2020-05-06
Attending: PHYSICIAN ASSISTANT
Payer: MEDICARE

## 2020-05-06 DIAGNOSIS — N18.3: ICD-10-CM

## 2020-05-06 DIAGNOSIS — R80.9: ICD-10-CM

## 2020-05-06 DIAGNOSIS — E11.21: ICD-10-CM

## 2020-05-06 DIAGNOSIS — E11.22: Primary | ICD-10-CM

## 2020-05-06 LAB
ADD MANUAL DIFF: NO
ALBUMIN SERPL-MCNC: 3.8 G/DL (ref 3.5–5)
ALP SERPL-CCNC: 89 U/L (ref 38–126)
ANION GAP SERPL CALC-SCNC: 8 MMOL/L (ref 5–19)
AST SERPL-CCNC: 45 U/L (ref 17–59)
BASOPHILS # BLD AUTO: 0 10^3/UL (ref 0–0.2)
BASOPHILS NFR BLD AUTO: 0.7 % (ref 0–2)
BILIRUB DIRECT SERPL-MCNC: 0 MG/DL (ref 0–0.4)
BILIRUB SERPL-MCNC: 0.4 MG/DL (ref 0.2–1.3)
BUN SERPL-MCNC: 17 MG/DL (ref 7–20)
CALCIUM: 8.8 MG/DL (ref 8.4–10.2)
CHLORIDE SERPL-SCNC: 102 MMOL/L (ref 98–107)
CO2 SERPL-SCNC: 29 MMOL/L (ref 22–30)
EOSINOPHIL # BLD AUTO: 0.2 10^3/UL (ref 0–0.6)
EOSINOPHIL NFR BLD AUTO: 5.2 % (ref 0–6)
ERYTHROCYTE [DISTWIDTH] IN BLOOD BY AUTOMATED COUNT: 13 % (ref 11.5–14)
GLUCOSE SERPL-MCNC: 174 MG/DL (ref 75–110)
HCT VFR BLD CALC: 41.7 % (ref 37.9–51)
HGB BLD-MCNC: 14.3 G/DL (ref 13.5–17)
LYMPHOCYTES # BLD AUTO: 1.6 10^3/UL (ref 0.5–4.7)
LYMPHOCYTES NFR BLD AUTO: 34.1 % (ref 13–45)
MCH RBC QN AUTO: 31.6 PG (ref 27–33.4)
MCHC RBC AUTO-ENTMCNC: 34.2 G/DL (ref 32–36)
MCV RBC AUTO: 92 FL (ref 80–97)
MONOCYTES # BLD AUTO: 0.2 10^3/UL (ref 0.1–1.4)
MONOCYTES NFR BLD AUTO: 5.1 % (ref 3–13)
NEUTROPHILS # BLD AUTO: 2.5 10^3/UL (ref 1.7–8.2)
NEUTS SEG NFR BLD AUTO: 54.9 % (ref 42–78)
PLATELET # BLD: 179 10^3/UL (ref 150–450)
POTASSIUM SERPL-SCNC: 4 MMOL/L (ref 3.6–5)
PROT SERPL-MCNC: 6.6 G/DL (ref 6.3–8.2)
RBC # BLD AUTO: 4.52 10^6/UL (ref 4.35–5.55)
TOTAL CELLS COUNTED % (AUTO): 100 %
WBC # BLD AUTO: 4.6 10^3/UL (ref 4–10.5)

## 2020-05-06 PROCEDURE — 36415 COLL VENOUS BLD VENIPUNCTURE: CPT

## 2020-05-06 PROCEDURE — 82550 ASSAY OF CK (CPK): CPT

## 2020-05-06 PROCEDURE — 81001 URINALYSIS AUTO W/SCOPE: CPT

## 2020-05-06 PROCEDURE — 85025 COMPLETE CBC W/AUTO DIFF WBC: CPT

## 2020-05-06 PROCEDURE — 80053 COMPREHEN METABOLIC PANEL: CPT

## 2020-05-07 LAB
APPEARANCE UR: CLEAR
APTT PPP: YELLOW S
BILIRUB UR QL STRIP: NEGATIVE
GLUCOSE UR STRIP-MCNC: NEGATIVE MG/DL
KETONES UR STRIP-MCNC: NEGATIVE MG/DL
NITRITE UR QL STRIP: NEGATIVE
PH UR STRIP: 5 [PH] (ref 5–9)
PROT UR STRIP-MCNC: NEGATIVE MG/DL
SP GR UR STRIP: 1.02
UROBILINOGEN UR-MCNC: NEGATIVE MG/DL (ref ?–2)

## 2020-08-10 ENCOUNTER — HOSPITAL ENCOUNTER (OUTPATIENT)
Dept: HOSPITAL 62 - OD | Age: 70
End: 2020-08-10
Attending: PHYSICIAN ASSISTANT
Payer: MEDICARE

## 2020-08-10 DIAGNOSIS — N18.3: ICD-10-CM

## 2020-08-10 DIAGNOSIS — E11.22: ICD-10-CM

## 2020-08-10 DIAGNOSIS — I12.9: Primary | ICD-10-CM

## 2020-08-10 LAB
ADD MANUAL DIFF: NO
ANION GAP SERPL CALC-SCNC: 6 MMOL/L (ref 5–19)
APPEARANCE UR: CLEAR
APTT PPP: YELLOW S
BASOPHILS # BLD AUTO: 0 10^3/UL (ref 0–0.2)
BASOPHILS NFR BLD AUTO: 0.6 % (ref 0–2)
BILIRUB UR QL STRIP: NEGATIVE
BUN SERPL-MCNC: 14 MG/DL (ref 7–20)
CALCIUM: 9 MG/DL (ref 8.4–10.2)
CHLORIDE SERPL-SCNC: 103 MMOL/L (ref 98–107)
CO2 SERPL-SCNC: 31 MMOL/L (ref 22–30)
EOSINOPHIL # BLD AUTO: 0.2 10^3/UL (ref 0–0.6)
EOSINOPHIL NFR BLD AUTO: 3.9 % (ref 0–6)
ERYTHROCYTE [DISTWIDTH] IN BLOOD BY AUTOMATED COUNT: 13.4 % (ref 11.5–14)
GLUCOSE SERPL-MCNC: 95 MG/DL (ref 75–110)
GLUCOSE UR STRIP-MCNC: NEGATIVE MG/DL
HCT VFR BLD CALC: 44.2 % (ref 37.9–51)
HGB BLD-MCNC: 14.6 G/DL (ref 13.5–17)
KETONES UR STRIP-MCNC: NEGATIVE MG/DL
LYMPHOCYTES # BLD AUTO: 2 10^3/UL (ref 0.5–4.7)
LYMPHOCYTES NFR BLD AUTO: 36.8 % (ref 13–45)
MCH RBC QN AUTO: 31.2 PG (ref 27–33.4)
MCHC RBC AUTO-ENTMCNC: 33 G/DL (ref 32–36)
MCV RBC AUTO: 95 FL (ref 80–97)
MONOCYTES # BLD AUTO: 0.4 10^3/UL (ref 0.1–1.4)
MONOCYTES NFR BLD AUTO: 7.7 % (ref 3–13)
NEUTROPHILS # BLD AUTO: 2.8 10^3/UL (ref 1.7–8.2)
NEUTS SEG NFR BLD AUTO: 51 % (ref 42–78)
NITRITE UR QL STRIP: NEGATIVE
PH UR STRIP: 6 [PH] (ref 5–9)
PHOSPHATE SERPL-MCNC: 2.1 MG/DL (ref 2.5–4.5)
PLATELET # BLD: 190 10^3/UL (ref 150–450)
POTASSIUM SERPL-SCNC: 4.1 MMOL/L (ref 3.6–5)
PROT UR STRIP-MCNC: 100 MG/DL
RBC # BLD AUTO: 4.68 10^6/UL (ref 4.35–5.55)
SP GR UR STRIP: 1.03
TOTAL CELLS COUNTED % (AUTO): 100 %
UROBILINOGEN UR-MCNC: 4 MG/DL (ref ?–2)
WBC # BLD AUTO: 5.5 10^3/UL (ref 4–10.5)

## 2020-08-10 PROCEDURE — 85025 COMPLETE CBC W/AUTO DIFF WBC: CPT

## 2020-08-10 PROCEDURE — 83970 ASSAY OF PARATHORMONE: CPT

## 2020-08-10 PROCEDURE — 82043 UR ALBUMIN QUANTITATIVE: CPT

## 2020-08-10 PROCEDURE — 82306 VITAMIN D 25 HYDROXY: CPT

## 2020-08-10 PROCEDURE — 82570 ASSAY OF URINE CREATININE: CPT

## 2020-08-10 PROCEDURE — 36415 COLL VENOUS BLD VENIPUNCTURE: CPT

## 2020-08-10 PROCEDURE — 84100 ASSAY OF PHOSPHORUS: CPT

## 2020-08-10 PROCEDURE — 81001 URINALYSIS AUTO W/SCOPE: CPT

## 2020-08-10 PROCEDURE — 80048 BASIC METABOLIC PNL TOTAL CA: CPT

## 2020-10-17 ENCOUNTER — HOSPITAL ENCOUNTER (EMERGENCY)
Dept: HOSPITAL 62 - ER | Age: 70
Discharge: HOME | End: 2020-10-17
Payer: OTHER GOVERNMENT

## 2020-10-17 VITALS — DIASTOLIC BLOOD PRESSURE: 95 MMHG | SYSTOLIC BLOOD PRESSURE: 159 MMHG

## 2020-10-17 DIAGNOSIS — J44.9: ICD-10-CM

## 2020-10-17 DIAGNOSIS — E11.9: ICD-10-CM

## 2020-10-17 DIAGNOSIS — R10.9: Primary | ICD-10-CM

## 2020-10-17 DIAGNOSIS — K59.09: ICD-10-CM

## 2020-10-17 DIAGNOSIS — Z79.4: ICD-10-CM

## 2020-10-17 LAB
ADD MANUAL DIFF: NO
ALBUMIN SERPL-MCNC: 3.8 G/DL (ref 3.5–5)
ALP SERPL-CCNC: 82 U/L (ref 38–126)
ANION GAP SERPL CALC-SCNC: 8 MMOL/L (ref 5–19)
APPEARANCE UR: CLEAR
APTT PPP: YELLOW S
AST SERPL-CCNC: 45 U/L (ref 17–59)
BASOPHILS # BLD AUTO: 0 10^3/UL (ref 0–0.2)
BASOPHILS NFR BLD AUTO: 0.6 % (ref 0–2)
BILIRUB DIRECT SERPL-MCNC: 0.3 MG/DL (ref 0–0.4)
BILIRUB SERPL-MCNC: 0.3 MG/DL (ref 0.2–1.3)
BILIRUB UR QL STRIP: NEGATIVE
BUN SERPL-MCNC: 16 MG/DL (ref 7–20)
CALCIUM: 9.3 MG/DL (ref 8.4–10.2)
CHLORIDE SERPL-SCNC: 103 MMOL/L (ref 98–107)
CO2 SERPL-SCNC: 29 MMOL/L (ref 22–30)
EOSINOPHIL # BLD AUTO: 0.2 10^3/UL (ref 0–0.6)
EOSINOPHIL NFR BLD AUTO: 3.5 % (ref 0–6)
ERYTHROCYTE [DISTWIDTH] IN BLOOD BY AUTOMATED COUNT: 13.2 % (ref 11.5–14)
GLUCOSE SERPL-MCNC: 173 MG/DL (ref 75–110)
GLUCOSE UR STRIP-MCNC: NEGATIVE MG/DL
HCT VFR BLD CALC: 43.3 % (ref 37.9–51)
HGB BLD-MCNC: 14.8 G/DL (ref 13.5–17)
KETONES UR STRIP-MCNC: NEGATIVE MG/DL
LYMPHOCYTES # BLD AUTO: 1.4 10^3/UL (ref 0.5–4.7)
LYMPHOCYTES NFR BLD AUTO: 29.6 % (ref 13–45)
MCH RBC QN AUTO: 31.8 PG (ref 27–33.4)
MCHC RBC AUTO-ENTMCNC: 34.3 G/DL (ref 32–36)
MCV RBC AUTO: 93 FL (ref 80–97)
MONOCYTES # BLD AUTO: 0.3 10^3/UL (ref 0.1–1.4)
MONOCYTES NFR BLD AUTO: 7.1 % (ref 3–13)
NEUTROPHILS # BLD AUTO: 2.8 10^3/UL (ref 1.7–8.2)
NEUTS SEG NFR BLD AUTO: 59.2 % (ref 42–78)
NITRITE UR QL STRIP: NEGATIVE
PH UR STRIP: 5 [PH] (ref 5–9)
PLATELET # BLD: 165 10^3/UL (ref 150–450)
POTASSIUM SERPL-SCNC: 3.7 MMOL/L (ref 3.6–5)
PROT SERPL-MCNC: 6.6 G/DL (ref 6.3–8.2)
PROT UR STRIP-MCNC: 30 MG/DL
RBC # BLD AUTO: 4.66 10^6/UL (ref 4.35–5.55)
SP GR UR STRIP: 1.02
TOTAL CELLS COUNTED % (AUTO): 100 %
UROBILINOGEN UR-MCNC: 2 MG/DL (ref ?–2)
WBC # BLD AUTO: 4.8 10^3/UL (ref 4–10.5)

## 2020-10-17 PROCEDURE — 36415 COLL VENOUS BLD VENIPUNCTURE: CPT

## 2020-10-17 PROCEDURE — 93010 ELECTROCARDIOGRAM REPORT: CPT

## 2020-10-17 PROCEDURE — 85025 COMPLETE CBC W/AUTO DIFF WBC: CPT

## 2020-10-17 PROCEDURE — 81001 URINALYSIS AUTO W/SCOPE: CPT

## 2020-10-17 PROCEDURE — 99285 EMERGENCY DEPT VISIT HI MDM: CPT

## 2020-10-17 PROCEDURE — 74022 RADEX COMPL AQT ABD SERIES: CPT

## 2020-10-17 PROCEDURE — 93005 ELECTROCARDIOGRAM TRACING: CPT

## 2020-10-17 PROCEDURE — 80053 COMPREHEN METABOLIC PANEL: CPT

## 2020-10-17 PROCEDURE — 83690 ASSAY OF LIPASE: CPT

## 2020-10-17 NOTE — EKG REPORT
SEVERITY:- ABNORMAL ECG -

SINUS RHYTHM

PROBABLE INFERIOR INFARCT, OLD

ANTERIOR INFARCT, AGE INDETERMINATE

:

Confirmed by: Jerardo Ulloa 17-Oct-2020 09:49:52

## 2020-10-17 NOTE — ER DOCUMENT REPORT
Entered by WILIAN ESCALANTE SCRIBE  10/17/20 0844 





Acting as scribe for:LORENZO AMADOR MD





ED General





- General


Chief Complaint: Flank Pain


Stated Complaint: ABDOMINAL PAIN


Time Seen by Provider: 10/17/20 08:00


Primary Care Provider: 


GURWINDER HOLLAND PA-C [Primary Care Provider] - Follow up as needed


Information source: Patient


Notes: 





This 69 year old male patient presents to the emergency department today with 

complaints of right sided abdominal pain for the last two years. Patient reports

that it seems to always be 10 to 15 minutes after eating food, but then goes on 

to mention that this pain is relieved with sitting still or laying down flat.  

It seems to be be brought on with movement and not necessarily food after 

further discussion.  He states has mentioned this to his PCP multiple times and 

they have not really found anything.  Patient was asked what is new or different

today that brought him into the emergency department for evaluation and he state

s he has a dry mouth.





TRAVEL OUTSIDE OF THE [place] IN LAST 30 DAYS: No





- Related Data


Allergies/Adverse Reactions: 


                                        





No Known Allergies Allergy (Verified 04/03/19 18:13)


   











Past Medical History





- General


Information source: Patient





- Social History


Smoking Status: Former Smoker - brief history 50 years ago


Cigarette use (# per day): No


Chew tobacco use (# tins/day): No


Frequency of alcohol use: None


Drug Abuse: None


Lives with: Family


Family History: Reviewed & Not Pertinent, DM, Hypertension





- Past Medical History


Cardiac Medical History: Reports: Hx Congestive Heart Failure, Hx Heart Attack, 

Hx Hypercholesterolemia, Hx Hypertension


Pulmonary Medical History: Reports: Hx COPD


Endocrine Medical History: Reports: Hx Diabetes Mellitus Type 2 - IDDM


Renal/ Medical History: Reports: Hx Kidney Stones, Hx Renal Insufficiency


GI Medical History: Reports: Hx Gastroesophageal Reflux Disease


Psychiatric Medical History: Reports: Hx Depression


Past Surgical History: Reports: Hx Abdominal Surgery - Nissen fundoplication, Hx

Cardiac Surgery - cardioMEMS implanted, Hx Cholecystectomy, Hx Tonsillectomy, 

Other - CardioMEMS heart monitor





- Immunizations


Hx Diphtheria, Pertussis, Tetanus Vaccination: Yes


Hx Pneumococcal Vaccination: 01/01/13





Review of Systems





- Review of Systems


Constitutional: No symptoms reported


EENT: See HPI, Other - dry mouth


Cardiovascular: No symptoms reported


Respiratory: No symptoms reported


Gastrointestinal: See HPI, Abdominal pain.  denies: Constipation


Genitourinary: No symptoms reported


Male Genitourinary: No symptoms reported


Musculoskeletal: No symptoms reported


Skin: No symptoms reported


Hematologic/Lymphatic: No symptoms reported


Neurological/Psychological: No symptoms reported


-: Yes All other systems reviewed and negative





Physical Exam





- Vital signs


Vitals: 


                                        











Resp Pulse Ox


 


 12   99 


 


 10/17/20 07:29  10/17/20 07:29














- Notes


Notes: 





Physical Exam:


 


General: Alert, appears well. 


 


HEENT: Normocephalic. Atraumatic. PERRL. Extraocular movements intact. 

Oropharynx clear.


 


Neck: Supple. Non-tender.


 


Respiratory: No respiratory distress. Clear and equal breath sounds bilaterally.


 


Cardiovascular: Regular rate and rhythm. 


 


Abdominal: Morbidly . Non-tender. No distension. Normal Bowel Sounds. 


 


Back: No gross abnormalities. 


 


Extremities: Moves all four extremities.


Upper extremities: Normal inspection. Normal ROM.  


Lower extremities: Normal inspection. No edema. Normal ROM.


 


Neurological: Normal cognition. AAOx4. Normal speech.  


 


Psychological: Normal affect. Normal Mood. 


 


Skin: Warm. Dry. Normal color.





Course





- Re-evaluation


Re-evalutation: 





10/17/20 11:07


Patient resting comfortably not having any abdominal pain at this time.


10/17/20 11:07


Vital signs are stable.





- Vital Signs


Vital signs: 


                                        











Temp Pulse Resp BP Pulse Ox


 


 98.4 F      16   153/103 H  99 


 


 10/17/20 07:34     10/17/20 10:01  10/17/20 10:01  10/17/20 10:01














- Laboratory


Result Diagrams: 


                                 10/17/20 08:06





                                 10/17/20 08:06


Laboratory results interpreted by me: 


                                        











  10/17/20 10/17/20





  08:06 10:32


 


Creatinine  1.36 H 


 


Est GFR (MDRD) Non-Af  52 L 


 


Glucose  173 H 


 


Urine Protein   30 H


 


Urine Urobilinogen   2.0 H











10/17/20 11:08


Creatinine 1.3 today.  Glucose 173 no other acute significant problems.  Patient

does have urine protein of 30.





- Diagnostic Test


Radiology reviewed: Image reviewed, Reports reviewed


Radiology results interpreted by me: 





10/17/20 11:08





                                        





Acute Abdomen Series  10/17/20 08:49


IMPRESSION:  NO RADIOGRAPHIC EVIDENCE FOR ACUTE ABDOMINAL DISEASE.


 








Acute abdominal series shows no radiographic evidence of anything on the acute 

abdominal series which includes a chest x-ray abdomen flat and upright.





- EKG Interpretation by Me


Additional EKG results interpreted by me: 





10/17/20 11:09


Twelve-lead EKG shows a normal sinus rhythm rate of 60 first-degree AV block QRS

interval and QT interval within normal interval range.  Patient has an old 

inferior infarct with Q waves in 3 and aVF also there is an old age 

indeterminate anterior infarct with loss of R wave progression until you get V4 

there is no acute ST elevation.  Patient has a normal axis.





Discharge





- Discharge


Clinical Impression: 


 Abdominal wall pain, Chronic constipation





Condition: Stable


Disposition: HOME, SELF-CARE


Additional Instructions: 


Constipation





     Constipation is a common problem.  It is especially likely as you get 

older.  Constipation is a common cause of abdominal pain, but sometimes causes 

no symptoms at all.  Causes of constipation include certain medications, 

dehydration, diets, inactivity, and low-fiber intake.  Rarely, it can be a 

symptom of underlying disease.  The physician has evaluated you for this.


     Avoid constipation by eating a diet high in fiber, fruits, and vegetables. 

Drink plenty of liquids.  Get regular exercise.  If possible, avoid constipating

medicines like narcotic pain medication.  Some vitamin tablets can cause 

constipation.  Stool softeners may be needed for difficult cases.  An excellent 

stool softener is Konsyl which is available at [employer], and [employer].  Just add a teaspoon to a glass of pineapple or orange juice daily 

or twice a day if needed.


   Laxatives are useful for occasional constipation.  You should use them only 

when necessary.  Too-frequent use can make your bowels dependent on them.  Some 

over the counter laxatives available without prescription are:





   Milk of Magnesia, 1-2 tablespoons twice a day


    Dulcolax, 5 mg pill or 10 mg suppository.


   Citrate of Magnesia, 4-5 ounces a day for a day or two





For acute constipation, Fleet's Enemas and Dulcolax suppositories are helpful.


     Chronic, long term  use of laxatives or enemas is not a good idea.  Your 

bowel may become dependant on them.  You do not need to have a bowel movement 

every day.  Many people do fine with a bowel movement every three or four days.


     You should call your doctor or return for re-evaluation if you pass blood 

in the stool, or if you develop fever or increasing abdominal pain.Abdominal 

Pain





     There are many causes of abdominal pain.  Pain can mean a serious problem 

requiring surgery (such as appendicitis). It can also be an innocent problem 

that goes away on its own (such as a viral infection).  Often, time must pass to

determine the cause of pain.


     The physician does not feel that hospitalization is necessary, at present. 

Things may change within the next 24 hours. Call the doctor or come back for re-

examination if any problems occur, such as:


     (1) Pain that becomes more severe, steady, or becomes concentrated in one 

specific area.  Also, pain that is more severe with movement or coughing.  


     (2) Vomiting that persists or becomes more frequent.  


     (3) Blood in the vomitus, urine, or bowel movements.  Blood in the stool 

may have a tarry or black appearance.


     (4) Shaking chills or fever greater than 100 degrees F. 


     (5) The abdomen becomes more distended or swollen. 


     (6) Bowel movements cease. 


     (7) Failure to improve as expected.





Recommend that your goal should be to have at least 1 bowel movement on a daily 

basis.  Also if she is continue to have abdominal wall pain I recommend Tylenol 

over-the-counter as needed for pain.


Referrals: 


GURWINDER HOLLAND PA-C [Primary Care Provider] - Follow up as needed





I personally performed the services described in the documentation, reviewed and

edited the documentation which was dictated to the scribe in my presence, and it

accurately records my words and actions.

## 2020-10-17 NOTE — RADIOLOGY REPORT (SQ)
EXAM DESCRIPTION:  ACUTE ABDOMEN SERIES



IMAGES COMPLETED DATE/TIME:  10/17/2020 9:14 am



REASON FOR STUDY:  abd pain



COMPARISON:  Chest films 4/3/2019

CT abdomen and pelvis 4/3/2019



NUMBER OF VIEWS:  Three views.



TECHNIQUE:  Frontal chest, supine abdomen and upright/decubitus abdomen radiographic images acquired.




LIMITATIONS:  None.



FINDINGS:  CHEST: Lungs clear of infiltrates.  Cardiac silhouette size, tracy unremarkable.

FREE AIR: None. No abnormal gas collections.

BOWEL GAS PATTERN: Nonobstructive pattern. No dilated loops or air fluid levels.

CALCIFICATIONS: No suspicious calcifications.

HARDWARE: Clips right upper quadrant post cholecystectomy

SOFT TISSUES: No gross mass or suggestion of organomegaly.

BONES: No acute fracture.  No worrisome bone lesions.

OTHER: No other significant finding.



IMPRESSION:  NO RADIOGRAPHIC EVIDENCE FOR ACUTE ABDOMINAL DISEASE.



TECHNICAL DOCUMENTATION:  JOB ID:  [id number]

 2011 [employer]- All Rights Reserved



Reading location - IP/workstation name: [email or web address]

## 2020-11-14 ENCOUNTER — HOSPITAL ENCOUNTER (EMERGENCY)
Dept: HOSPITAL 62 - ER | Age: 70
LOS: 1 days | Discharge: HOME | End: 2020-11-15
Payer: OTHER GOVERNMENT

## 2020-11-14 DIAGNOSIS — J44.9: ICD-10-CM

## 2020-11-14 DIAGNOSIS — I25.2: ICD-10-CM

## 2020-11-14 DIAGNOSIS — I50.9: ICD-10-CM

## 2020-11-14 DIAGNOSIS — I25.10: ICD-10-CM

## 2020-11-14 DIAGNOSIS — R10.84: Primary | ICD-10-CM

## 2020-11-14 DIAGNOSIS — I11.0: ICD-10-CM

## 2020-11-14 DIAGNOSIS — K21.9: ICD-10-CM

## 2020-11-14 DIAGNOSIS — E78.5: ICD-10-CM

## 2020-11-14 DIAGNOSIS — R10.10: ICD-10-CM

## 2020-11-14 DIAGNOSIS — E11.9: ICD-10-CM

## 2020-11-14 LAB
ADD MANUAL DIFF: NO
ALBUMIN SERPL-MCNC: 4.1 G/DL (ref 3.5–5)
ALP SERPL-CCNC: 77 U/L (ref 38–126)
ANION GAP SERPL CALC-SCNC: 9 MMOL/L (ref 5–19)
AST SERPL-CCNC: 46 U/L (ref 17–59)
BASOPHILS # BLD AUTO: 0 10^3/UL (ref 0–0.2)
BASOPHILS NFR BLD AUTO: 0.9 % (ref 0–2)
BILIRUB DIRECT SERPL-MCNC: 0.1 MG/DL (ref 0–0.4)
BILIRUB SERPL-MCNC: 0.4 MG/DL (ref 0.2–1.3)
BUN SERPL-MCNC: 12 MG/DL (ref 7–20)
CALCIUM: 9.7 MG/DL (ref 8.4–10.2)
CHLORIDE SERPL-SCNC: 101 MMOL/L (ref 98–107)
CO2 SERPL-SCNC: 32 MMOL/L (ref 22–30)
EOSINOPHIL # BLD AUTO: 0.3 10^3/UL (ref 0–0.6)
EOSINOPHIL NFR BLD AUTO: 6.7 % (ref 0–6)
ERYTHROCYTE [DISTWIDTH] IN BLOOD BY AUTOMATED COUNT: 13.4 % (ref 11.5–14)
GLUCOSE SERPL-MCNC: 104 MG/DL (ref 75–110)
HCT VFR BLD CALC: 42.5 % (ref 37.9–51)
HGB BLD-MCNC: 15 G/DL (ref 13.5–17)
LYMPHOCYTES # BLD AUTO: 2.1 10^3/UL (ref 0.5–4.7)
LYMPHOCYTES NFR BLD AUTO: 41.9 % (ref 13–45)
MCH RBC QN AUTO: 32.8 PG (ref 27–33.4)
MCHC RBC AUTO-ENTMCNC: 35.2 G/DL (ref 32–36)
MCV RBC AUTO: 93 FL (ref 80–97)
MONOCYTES # BLD AUTO: 0.4 10^3/UL (ref 0.1–1.4)
MONOCYTES NFR BLD AUTO: 8 % (ref 3–13)
NEUTROPHILS # BLD AUTO: 2.1 10^3/UL (ref 1.7–8.2)
NEUTS SEG NFR BLD AUTO: 42.5 % (ref 42–78)
PLATELET # BLD: 194 10^3/UL (ref 150–450)
POTASSIUM SERPL-SCNC: 4.7 MMOL/L (ref 3.6–5)
PROT SERPL-MCNC: 7.1 G/DL (ref 6.3–8.2)
RBC # BLD AUTO: 4.57 10^6/UL (ref 4.35–5.55)
TOTAL CELLS COUNTED % (AUTO): 100 %
WBC # BLD AUTO: 4.9 10^3/UL (ref 4–10.5)

## 2020-11-14 PROCEDURE — 96375 TX/PRO/DX INJ NEW DRUG ADDON: CPT

## 2020-11-14 PROCEDURE — 80053 COMPREHEN METABOLIC PANEL: CPT

## 2020-11-14 PROCEDURE — 74177 CT ABD & PELVIS W/CONTRAST: CPT

## 2020-11-14 PROCEDURE — 99285 EMERGENCY DEPT VISIT HI MDM: CPT

## 2020-11-14 PROCEDURE — 85025 COMPLETE CBC W/AUTO DIFF WBC: CPT

## 2020-11-14 PROCEDURE — 93005 ELECTROCARDIOGRAM TRACING: CPT

## 2020-11-14 PROCEDURE — 84484 ASSAY OF TROPONIN QUANT: CPT

## 2020-11-14 PROCEDURE — 93010 ELECTROCARDIOGRAM REPORT: CPT

## 2020-11-14 PROCEDURE — 96361 HYDRATE IV INFUSION ADD-ON: CPT

## 2020-11-14 PROCEDURE — 71045 X-RAY EXAM CHEST 1 VIEW: CPT

## 2020-11-14 PROCEDURE — 83690 ASSAY OF LIPASE: CPT

## 2020-11-14 PROCEDURE — 81001 URINALYSIS AUTO W/SCOPE: CPT

## 2020-11-14 PROCEDURE — 36415 COLL VENOUS BLD VENIPUNCTURE: CPT

## 2020-11-14 PROCEDURE — 96374 THER/PROPH/DIAG INJ IV PUSH: CPT

## 2020-11-14 NOTE — ER DOCUMENT REPORT
ED Medical Screen (RME)





- General


Chief Complaint: Abdominal Pain


Stated Complaint: ABDOMINAL PAIN


Time Seen by Provider: 11/14/20 22:32


Primary Care Provider: 


GURWINDER HOLLAND PA-C [Primary Care Provider] - Follow up as needed


Mode of Arrival: Wheelchair


Information source: Patient


Notes: 





HPI; 70-year-old male brought to the emergency room by EMS complaining of worse

maira abdominal pain that he states had for the past 2 weeks.  States the pain is

mostly on his left upper side of his abdomen.  Describes it as a "shemar 

type of pain" states is intermittent.  States he was discharged yesterday from 

[facility] but has not picked up the medications that they discharged him 

home with.  States he was here a week ago when actually he was here October 17. 

States he had a negative work-up on the 17th and was discharged home.  States 

the pain is gotten more severe today.  He denies any nausea, vomiting, no 

fevers, no urinary symptoms.  Taking Tylenol without relief.





PE:


Alert and oriented x3.  Mild distress noted.  Lungs: Clear to auscultation 

without rales, rhonchi, wheezes.  Heart: Regular rate rhythm without murmurs, 

rubs, gallops.





I have greeted and performed a rapid initial assessment of this patient.  A 

comprehensive ED assessment and evaluation of the patient, analysis of test 

results and completion of the medical decision making process will be conducted 

by additional ED providers.  I have specifically instructed the patient or 

family members with the patient to immediately return to any nursing staff 

should anything change in the patient's condition or with their chief complaint.


TRAVEL OUTSIDE OF THE [place] IN LAST 30 DAYS: No





- Related Data


Allergies/Adverse Reactions: 


                                        





No Known Allergies Allergy (Verified 04/03/19 18:13)


   











Past Medical History





- Past Medical History


Cardiac Medical History: Reports: Hx Congestive Heart Failure, Hx Heart Attack, 

Hx Hypercholesterolemia, Hx Hypertension


Pulmonary Medical History: Reports: Hx COPD


Endocrine Medical History: Reports: Hx Diabetes Mellitus Type 2 - IDDM


Renal/ Medical History: Reports: Hx Kidney Stones, Hx Renal Insufficiency.  

Denies: Hx Peritoneal Dialysis


GI Medical History: Reports: Hx Gastroesophageal Reflux Disease


Psychiatric Medical History: Reports: Hx Depression


Past Surgical History: Reports: Hx Abdominal Surgery - Nissen fundoplication, Hx

Cardiac Surgery - cardioMEMS implanted, Hx Cholecystectomy, Hx Tonsillectomy, 

Other - CardioMEMS heart monitor





- Immunizations


Hx Diphtheria, Pertussis, Tetanus Vaccination: Yes





Physical Exam





- Vital signs


Vitals: 





                                        











Temp Pulse Resp BP Pulse Ox


 


 97.8 F   62   16   134/80 H  98 


 


 11/14/20 22:35  11/14/20 22:35  11/14/20 22:35  11/14/20 22:35  11/14/20 22:35














Course





- Vital Signs


Vital signs: 





                                        











Temp Pulse Resp BP Pulse Ox


 


 97.8 F   62   16   134/80 H  98 


 


 11/14/20 22:35  11/14/20 22:35  11/14/20 22:35  11/14/20 22:35  11/14/20 22:35














Doctor's Discharge





- Discharge


Referrals: 


GURWINDER HOLLAND PA-C [Primary Care Provider] - Follow up as needed

## 2020-11-14 NOTE — ER DOCUMENT REPORT
ED General





- General


Chief Complaint: Abdominal Pain


Stated Complaint: ABDOMINAL PAIN


Time Seen by Provider: 11/14/20 22:32


Primary Care Provider: 


GURWINDER HOLLAND PA-C [Primary Care Provider] - Follow up as needed


Mode of Arrival: Wheelchair


Notes: 


Patient is a 70-year-old male who comes emergency department for chief complaint

of sharp waves of abdominal pain in the mid to upper abdomen on both sides.  He 

states he has been having this for about 2 weeks.  He states that it feels like 

his abdomen "contracts" and the pain gets severe intermittently.  He is still 

having bowel movements, nonbloody and unremarkable, he is unable to vomit with a

history of using public occasion, he has had a cholecystectomy.  He states he 

was admitted for 2 days at [facility] for the pain and he was sent home with 

instructions to have a liquid diet and take pain medication, he states he has 

not not been able to fill the pain medication.  He states he had an endoscopy as

well and was told that there was no concerning finding.  He states after he went

home the pain actually got more severe prompting him to come to the emergency 

department.  He states he has been taking Tylenol for pain without relief.  

Patient also has a past medical history of CAD, MI, hypertension, 

hyperlipidemia, CHF, type 2 diabetes, GERD.








TRAVEL OUTSIDE OF THE [place] IN LAST 30 DAYS: No





- Related Data


Allergies/Adverse Reactions: 


                                        





No Known Allergies Allergy (Verified 04/03/19 18:13)


   











Past Medical History





- General


Information source: Patient





- Social History


Smoking Status: Never Smoker


Frequency of alcohol use: None


Drug Abuse: None


Lives with: Family


Family History: Reviewed & Not Pertinent, DM, Hypertension


Patient has homicidal ideation: No





- Past Medical History


Cardiac Medical History: Reports: Hx Congestive Heart Failure, Hx Heart Attack, 

Hx Hypercholesterolemia, Hx Hypertension


Pulmonary Medical History: Reports: Hx COPD


Endocrine Medical History: Reports: Hx Diabetes Mellitus Type 2 - IDDM


Renal/ Medical History: Reports: Hx Kidney Stones, Hx Renal Insufficiency.  

Denies: Hx Peritoneal Dialysis


GI Medical History: Reports: Hx Gastroesophageal Reflux Disease


Psychiatric Medical History: Reports: Hx Depression


Past Surgical History: Reports: Hx Abdominal Surgery - Nissen fundoplication, Hx

Cardiac Surgery - cardioMEMS implanted, Hx Cholecystectomy, Hx Tonsillectomy, 

Other - CardioMEMS heart monitor





- Immunizations


Hx Diphtheria, Pertussis, Tetanus Vaccination: Yes


Hx Pneumococcal Vaccination: 01/01/13





Review of Systems





- Review of Systems


Constitutional: No symptoms reported


EENT: No symptoms reported


Cardiovascular: No symptoms reported


Respiratory: No symptoms reported


Gastrointestinal: See HPI


Genitourinary: No symptoms reported


Male Genitourinary: No symptoms reported


Musculoskeletal: No symptoms reported


Skin: No symptoms reported


Hematologic/Lymphatic: No symptoms reported


Neurological/Psychological: No symptoms reported





Physical Exam





- Vital signs


Vitals: 


                                        











Temp Pulse Resp BP Pulse Ox


 


 97.8 F   62   16   134/80 H  98 


 


 11/14/20 22:35  11/14/20 22:35  11/14/20 22:35  11/14/20 22:35  11/14/20 22:35














- Notes


Notes: 





GENERAL: Alert, interacts well.  Intermittently appears to be in pain but there 

is no severe distress


HEAD: Normocephalic, atraumatic.


EYES: Pupils equal, round, and reactive to light. Extraocular movements intact.


ENT: Oral mucosa moist, tongue midline. Oropharynx unremarkable. Airway patent. 


NECK: Full range of motion. Supple. Trachea midline. No lymphadenopathy.


LUNGS: Clear to auscultation bilaterally, no wheezes, rales, or rhonchi. No 

respiratory distress. Non-tender chest wall. 


HEART: Regular rate and rhythm. No murmur


ABDOMEN: Abdomen is questionably minimally distended.  There is mid to 

epigastric abdominal tenderness on palpation but this is generalized and not 

severe.  No guarding.  No rigidity.  No rebound tenderness.  Bowel sounds are 

quiet but present.


EXTREMITIES: Moves all 4 extremities spontaneously. No edema, normal radial and 

dorsalis pedis pulses bilaterally. No cyanosis.


BACK: no cervical, thoracic, lumbar midline tenderness. No saddle anesthesia, 

normal distal neurovascular exam. Moves all extremities in full range of motion.


NEUROLOGICAL: Alert and oriented x3. Normal speech. Cranial nerves II through 

XII grossly intact. Strength 5/5 in all extremities. 


PSYCH: Normal affect, normal mood.


SKIN: Warm, dry, normal turgor. No rashes or lesions noted.





Course





- Re-evaluation


Re-evalutation: 


Patient occasionally appears uncomfortable and grabs at his abdomen.  However 

this is intermittent and otherwise he appears comfortable.  He does have mid to 

upper abdominal tenderness but this is nonspecific without guarding.  CBC 

unremarkable, chemistry unremarkable except borderline creatinine of 1.5, 

troponin not elevated, lipase not elevated.  Chest x-ray without acute findings.

 Because of patient's ongoing symptoms, age, surgical history, intermittent 

discomfort with grabbing of the abdomen I discussed with patient and decision 

was made to perform CT with oral contrast to rule out acute etiology of the 

pain.





Patient tolerated the oral contrast well, after he was medicated for pain 

patient did not have recurrence of pain, patient was walking around the room 

well-appearing when I reevaluated him.  CT with no acute process.  Patient 

states he feels great now and he was to go home.  Based on his evaluation, 

reevaluation, work-up I do have low suspicion of acute abdomen.  Patient does 

have follow-up scheduled.  Patient will be provided with Carafate along with his

current medications, provided him with a Norco Dosepak to go home with, he has 

the rest of his prescriptions waiting.  I discussed details and return 

precautions at length.  Patient states appreciation and agreement.  Stable and 

well-appearing at time of discharge.








- Vital Signs


Vital signs: 


                                        











Temp Pulse Resp BP Pulse Ox


 


 98.0 F   60   20   134/88 H  99 


 


 11/15/20 04:09  11/15/20 04:09  11/15/20 04:09  11/15/20 04:09  11/15/20 04:09














- Laboratory


Result Diagrams: 


                                 11/14/20 22:50





                                 11/14/20 22:50


Laboratory results interpreted by me: 


                                        











  11/14/20 11/14/20 11/15/20





  22:50 22:50 01:40


 


Eos % (Auto)  6.7 H  


 


Carbon Dioxide   32 H 


 


Creatinine   1.50 H 


 


Est GFR ( Amer)   56 L 


 


Est GFR (MDRD) Non-Af   46 L 


 


Urine Protein    30 H














- EKG Interpretation by Me


Additional EKG results interpreted by me: 


EKG shows sinus rhythm at a rate of 60, QTc 468, left axis deviation.  PVCs 

noted.  First-degree AV block with GA interval of 222.  No overt T wave 

inversions or ST segment changes in consecutive leads, some artifact is present.





Discharge





- Discharge


Clinical Impression: 


Abdominal pain


Qualifiers:


 Abdominal location: generalized Qualified Code(s): R10.84 - Generalized 

abdominal pain





Condition: Stable


Disposition: HOME, SELF-CARE


Instructions:  Oral Narcotic Medication ([facility])


Additional Instructions: 


Your laboratory work-up, evaluation, and imaging do not show any obvious or 

concerning findings.


I recommend a bland diet, the Carafate, and close follow-up with your provider 

for additional management.


Return if you worsen including severe worsening pain, fever, or any other 

concerning or worsening symptoms.


Prescriptions: 


Sucralfate [Carafate 1 gm Tablet] 1 gm PO QID #20 tablet


Referrals: 


GURWINDER HOLLAND PA-C [Primary Care Provider] - Follow up as needed

## 2020-11-15 VITALS — DIASTOLIC BLOOD PRESSURE: 88 MMHG | SYSTOLIC BLOOD PRESSURE: 134 MMHG

## 2020-11-15 LAB
APPEARANCE UR: CLEAR
APTT PPP: YELLOW S
BILIRUB UR QL STRIP: NEGATIVE
GLUCOSE UR STRIP-MCNC: NEGATIVE MG/DL
KETONES UR STRIP-MCNC: NEGATIVE MG/DL
NITRITE UR QL STRIP: NEGATIVE
PH UR STRIP: 8 [PH] (ref 5–9)
PROT UR STRIP-MCNC: 30 MG/DL
SP GR UR STRIP: 1.01
UROBILINOGEN UR-MCNC: NEGATIVE MG/DL (ref ?–2)

## 2020-11-15 NOTE — RADIOLOGY REPORT (SQ)
CLINICAL HISTORY:  sharp mid/upper abd pain, abd swelling 



COMPARISON: 3/5/2019.



TECHNIQUE: XR CHEST 1 VIEW 11/14/2020 11:30 PM CST



FINDINGS: 



The heart is enlarged. Lungs are clear without consolidation,

atelectasis, mass or edema. There is no pleural effusion. There

is no pneumothorax. There are no acute osseous findings.



IMPRESSION: 



Clear lungs.

## 2020-11-15 NOTE — RADIOLOGY REPORT (SQ)
CT ABDOMEN AND PELVIS WITH INTRAVENOUS CONTRAST: 11/15/2020 1:46

AM CST



HISTORY: 70-year old with sharp upper abdominal pain and back

pain.



COMPARISON: CT of abdomen and pelvis from 4/3/2019 TECHNIQUE:

Axial contiguous images were obtained from the lung bases to the

proximal femurs with intravenous intravenous contrast

administered. Oral contrast was also given to the patient.

Sagittal and coronal reconstructions were also obtained and

reviewed. This exam was performed according to our departmental

dose-optimization program, which includes automated exposure

control, adjustment of the mA and/or KV according to the

patient's size and/or use of iterative reconstruction technique.



FINDINGS:  There are bibasilar airspace opacities present.  No

discrete pleural effusions are seen. The cardiac silhouette is

enlarged. There is a small hiatal hernia. There is oral contrast

seen within the hiatal hernia.



The visualized hepatic parenchyma is diffusely low in

attenuation. No focal enhancing lesion is seen. 



The gallbladder is surgically absent.



The spleen is normal in size. The pancreas is unremarkable. 



The bilateral adrenal glands appear unremarkable.



Both kidneys demonstrate no evidence of hydronephrosis. There is

a punctate calcification at the inferior pole the left kidney

which may be vascular. There are tiny hypodensities within the

kidneys which are mostly too small adequately characterize The

urinary bladder is mildly distended, and appears grossly

unremarkable. No bladder calculi are seen. There is fat seen

within the bilateral inguinal canals.



The stomach is not well distended. 



The small bowel loops appear unremarkable. No pericolonic

inflammatory stranding is seen. 



There is no evidence of pneumoperitoneum or free fluid. 



The aorta and IVC appear normal in size. 



No significantly enlarged lymph nodes are seen in the abdomen or

pelvis. 



Review of the bone show no evidence of any suspicious lytic or

blastic lesions.



IMPRESSION: No acute process is seen within the abdomen or

pelvis. 

Hepatic steatosis

## 2020-11-15 NOTE — EKG REPORT
SEVERITY:- ABNORMAL ECG -

SINUS RHYTHM

VENTRICULAR PREMATURE COMPLEX

FIRST DEGREE AV BLOCK

NONSPECIFIC INTRAVENTRICULAR CONDUCTION DELAY

LOW VOLTAGE IN FRONTAL LEADS

:

Confirmed by: Jerardo Ulloa 15-Nov-2020 09:00:21